# Patient Record
Sex: MALE | Race: OTHER | HISPANIC OR LATINO | Employment: FULL TIME | URBAN - METROPOLITAN AREA
[De-identification: names, ages, dates, MRNs, and addresses within clinical notes are randomized per-mention and may not be internally consistent; named-entity substitution may affect disease eponyms.]

---

## 2017-01-02 ENCOUNTER — APPOINTMENT (OUTPATIENT)
Dept: URGENT CARE | Facility: MEDICAL CENTER | Age: 32
End: 2017-01-02
Payer: OTHER MISCELLANEOUS

## 2017-01-02 PROCEDURE — 99214 OFFICE O/P EST MOD 30 MIN: CPT

## 2017-01-03 ENCOUNTER — APPOINTMENT (OUTPATIENT)
Dept: PHYSICAL THERAPY | Facility: REHABILITATION | Age: 32
End: 2017-01-03
Payer: OTHER MISCELLANEOUS

## 2017-01-03 PROCEDURE — 97110 THERAPEUTIC EXERCISES: CPT

## 2017-01-03 PROCEDURE — 97140 MANUAL THERAPY 1/> REGIONS: CPT

## 2017-01-03 PROCEDURE — 97014 ELECTRIC STIMULATION THERAPY: CPT

## 2017-01-06 ENCOUNTER — APPOINTMENT (OUTPATIENT)
Dept: PHYSICAL THERAPY | Facility: REHABILITATION | Age: 32
End: 2017-01-06
Payer: OTHER MISCELLANEOUS

## 2017-01-06 PROCEDURE — 97110 THERAPEUTIC EXERCISES: CPT

## 2017-01-06 PROCEDURE — 97140 MANUAL THERAPY 1/> REGIONS: CPT

## 2017-01-06 PROCEDURE — 97014 ELECTRIC STIMULATION THERAPY: CPT

## 2017-01-09 ENCOUNTER — APPOINTMENT (OUTPATIENT)
Dept: URGENT CARE | Facility: MEDICAL CENTER | Age: 32
End: 2017-01-09
Payer: OTHER MISCELLANEOUS

## 2017-01-09 ENCOUNTER — APPOINTMENT (OUTPATIENT)
Dept: PHYSICAL THERAPY | Facility: REHABILITATION | Age: 32
End: 2017-01-09
Payer: OTHER MISCELLANEOUS

## 2017-01-09 PROCEDURE — 97110 THERAPEUTIC EXERCISES: CPT

## 2017-01-09 PROCEDURE — 97140 MANUAL THERAPY 1/> REGIONS: CPT

## 2017-01-09 PROCEDURE — 97033 APP MDLTY 1+IONTPHRSIS EA 15: CPT

## 2017-01-09 PROCEDURE — 99214 OFFICE O/P EST MOD 30 MIN: CPT

## 2017-01-11 ENCOUNTER — APPOINTMENT (OUTPATIENT)
Dept: PHYSICAL THERAPY | Facility: REHABILITATION | Age: 32
End: 2017-01-11
Payer: OTHER MISCELLANEOUS

## 2017-01-11 PROCEDURE — 97033 APP MDLTY 1+IONTPHRSIS EA 15: CPT

## 2017-01-11 PROCEDURE — 97110 THERAPEUTIC EXERCISES: CPT

## 2017-01-11 PROCEDURE — 97140 MANUAL THERAPY 1/> REGIONS: CPT

## 2017-01-13 ENCOUNTER — APPOINTMENT (OUTPATIENT)
Dept: PHYSICAL THERAPY | Facility: REHABILITATION | Age: 32
End: 2017-01-13
Payer: OTHER MISCELLANEOUS

## 2017-01-13 PROCEDURE — 97140 MANUAL THERAPY 1/> REGIONS: CPT

## 2017-01-13 PROCEDURE — 97110 THERAPEUTIC EXERCISES: CPT

## 2017-01-13 PROCEDURE — 97035 APP MDLTY 1+ULTRASOUND EA 15: CPT

## 2017-01-16 ENCOUNTER — ALLSCRIPTS OFFICE VISIT (OUTPATIENT)
Dept: OTHER | Facility: OTHER | Age: 32
End: 2017-01-16

## 2017-01-16 DIAGNOSIS — M25.521 PAIN IN RIGHT ELBOW: ICD-10-CM

## 2017-01-16 DIAGNOSIS — M77.10 LATERAL EPICONDYLITIS OF ELBOW: ICD-10-CM

## 2017-01-27 ENCOUNTER — APPOINTMENT (OUTPATIENT)
Dept: OCCUPATIONAL THERAPY | Facility: MEDICAL CENTER | Age: 32
End: 2017-01-27
Payer: OTHER MISCELLANEOUS

## 2017-01-27 DIAGNOSIS — M25.521 PAIN IN RIGHT ELBOW: ICD-10-CM

## 2017-01-27 DIAGNOSIS — M77.10 LATERAL EPICONDYLITIS OF ELBOW: ICD-10-CM

## 2017-01-27 PROCEDURE — 97166 OT EVAL MOD COMPLEX 45 MIN: CPT

## 2017-02-01 ENCOUNTER — APPOINTMENT (OUTPATIENT)
Dept: OCCUPATIONAL THERAPY | Facility: MEDICAL CENTER | Age: 32
End: 2017-02-01
Payer: OTHER MISCELLANEOUS

## 2017-02-01 PROCEDURE — 97010 HOT OR COLD PACKS THERAPY: CPT

## 2017-02-01 PROCEDURE — 97033 APP MDLTY 1+IONTPHRSIS EA 15: CPT

## 2017-02-01 PROCEDURE — 97110 THERAPEUTIC EXERCISES: CPT

## 2017-02-03 ENCOUNTER — APPOINTMENT (OUTPATIENT)
Dept: OCCUPATIONAL THERAPY | Facility: MEDICAL CENTER | Age: 32
End: 2017-02-03
Payer: OTHER MISCELLANEOUS

## 2017-02-06 ENCOUNTER — APPOINTMENT (OUTPATIENT)
Dept: OCCUPATIONAL THERAPY | Facility: MEDICAL CENTER | Age: 32
End: 2017-02-06
Payer: OTHER MISCELLANEOUS

## 2017-02-06 PROCEDURE — 97110 THERAPEUTIC EXERCISES: CPT

## 2017-02-06 PROCEDURE — 97033 APP MDLTY 1+IONTPHRSIS EA 15: CPT

## 2017-02-06 PROCEDURE — 97010 HOT OR COLD PACKS THERAPY: CPT

## 2017-02-08 ENCOUNTER — APPOINTMENT (OUTPATIENT)
Dept: OCCUPATIONAL THERAPY | Facility: MEDICAL CENTER | Age: 32
End: 2017-02-08
Payer: OTHER MISCELLANEOUS

## 2017-02-08 PROCEDURE — 97110 THERAPEUTIC EXERCISES: CPT

## 2017-02-08 PROCEDURE — 97140 MANUAL THERAPY 1/> REGIONS: CPT

## 2017-02-08 PROCEDURE — 97010 HOT OR COLD PACKS THERAPY: CPT

## 2017-02-13 ENCOUNTER — APPOINTMENT (OUTPATIENT)
Dept: OCCUPATIONAL THERAPY | Facility: MEDICAL CENTER | Age: 32
End: 2017-02-13
Payer: OTHER MISCELLANEOUS

## 2017-02-13 PROCEDURE — 97110 THERAPEUTIC EXERCISES: CPT

## 2017-02-13 PROCEDURE — 97033 APP MDLTY 1+IONTPHRSIS EA 15: CPT

## 2017-02-13 PROCEDURE — 97010 HOT OR COLD PACKS THERAPY: CPT

## 2017-02-15 ENCOUNTER — APPOINTMENT (OUTPATIENT)
Dept: OCCUPATIONAL THERAPY | Facility: MEDICAL CENTER | Age: 32
End: 2017-02-15
Payer: OTHER MISCELLANEOUS

## 2017-02-15 PROCEDURE — 97010 HOT OR COLD PACKS THERAPY: CPT

## 2017-02-15 PROCEDURE — 97033 APP MDLTY 1+IONTPHRSIS EA 15: CPT

## 2017-02-15 PROCEDURE — 97110 THERAPEUTIC EXERCISES: CPT

## 2017-02-20 ENCOUNTER — ALLSCRIPTS OFFICE VISIT (OUTPATIENT)
Dept: OTHER | Facility: OTHER | Age: 32
End: 2017-02-20

## 2017-02-22 ENCOUNTER — APPOINTMENT (OUTPATIENT)
Dept: OCCUPATIONAL THERAPY | Facility: MEDICAL CENTER | Age: 32
End: 2017-02-22
Payer: OTHER MISCELLANEOUS

## 2017-02-22 PROCEDURE — 97033 APP MDLTY 1+IONTPHRSIS EA 15: CPT

## 2017-02-22 PROCEDURE — 97010 HOT OR COLD PACKS THERAPY: CPT

## 2017-02-22 PROCEDURE — 97110 THERAPEUTIC EXERCISES: CPT

## 2017-02-24 ENCOUNTER — APPOINTMENT (OUTPATIENT)
Dept: OCCUPATIONAL THERAPY | Facility: MEDICAL CENTER | Age: 32
End: 2017-02-24
Payer: OTHER MISCELLANEOUS

## 2017-02-24 PROCEDURE — 97033 APP MDLTY 1+IONTPHRSIS EA 15: CPT

## 2017-02-24 PROCEDURE — 97010 HOT OR COLD PACKS THERAPY: CPT

## 2017-02-24 PROCEDURE — 97110 THERAPEUTIC EXERCISES: CPT

## 2017-02-24 PROCEDURE — 97140 MANUAL THERAPY 1/> REGIONS: CPT

## 2017-02-27 ENCOUNTER — APPOINTMENT (OUTPATIENT)
Dept: OCCUPATIONAL THERAPY | Facility: MEDICAL CENTER | Age: 32
End: 2017-02-27
Payer: OTHER MISCELLANEOUS

## 2017-02-27 PROCEDURE — 97033 APP MDLTY 1+IONTPHRSIS EA 15: CPT

## 2017-02-27 PROCEDURE — 97110 THERAPEUTIC EXERCISES: CPT

## 2017-02-27 PROCEDURE — 97010 HOT OR COLD PACKS THERAPY: CPT

## 2017-03-01 ENCOUNTER — APPOINTMENT (OUTPATIENT)
Dept: OCCUPATIONAL THERAPY | Facility: MEDICAL CENTER | Age: 32
End: 2017-03-01
Payer: OTHER MISCELLANEOUS

## 2017-03-01 PROCEDURE — 97010 HOT OR COLD PACKS THERAPY: CPT

## 2017-03-01 PROCEDURE — 97110 THERAPEUTIC EXERCISES: CPT

## 2017-03-01 PROCEDURE — 97033 APP MDLTY 1+IONTPHRSIS EA 15: CPT

## 2017-03-27 ENCOUNTER — ALLSCRIPTS OFFICE VISIT (OUTPATIENT)
Dept: OTHER | Facility: OTHER | Age: 32
End: 2017-03-27

## 2017-03-27 ENCOUNTER — TRANSCRIBE ORDERS (OUTPATIENT)
Dept: ADMINISTRATIVE | Facility: HOSPITAL | Age: 32
End: 2017-03-27

## 2017-03-27 DIAGNOSIS — M25.529 PAIN IN JOINT, UPPER ARM, UNSPECIFIED LATERALITY: Primary | ICD-10-CM

## 2017-03-31 ENCOUNTER — HOSPITAL ENCOUNTER (OUTPATIENT)
Dept: RADIOLOGY | Age: 32
Discharge: HOME | End: 2017-03-31
Payer: OTHER MISCELLANEOUS

## 2017-03-31 DIAGNOSIS — M25.529 PAIN IN JOINT, UPPER ARM, UNSPECIFIED LATERALITY: ICD-10-CM

## 2017-03-31 PROCEDURE — 73221 MRI JOINT UPR EXTREM W/O DYE: CPT

## 2017-04-11 ENCOUNTER — ALLSCRIPTS OFFICE VISIT (OUTPATIENT)
Dept: OTHER | Facility: OTHER | Age: 32
End: 2017-04-11

## 2017-05-17 ENCOUNTER — LAB (OUTPATIENT)
Dept: LAB | Facility: HOSPITAL | Age: 32
End: 2017-05-17
Attending: ORTHOPAEDIC SURGERY
Payer: COMMERCIAL

## 2017-05-17 ENCOUNTER — TRANSCRIBE ORDERS (OUTPATIENT)
Dept: RADIOLOGY | Facility: HOSPITAL | Age: 32
End: 2017-05-17

## 2017-05-17 ENCOUNTER — TRANSCRIBE ORDERS (OUTPATIENT)
Dept: LAB | Facility: HOSPITAL | Age: 32
End: 2017-05-17

## 2017-05-17 ENCOUNTER — HOSPITAL ENCOUNTER (OUTPATIENT)
Dept: RADIOLOGY | Facility: HOSPITAL | Age: 32
Discharge: HOME | End: 2017-05-17
Attending: ORTHOPAEDIC SURGERY
Payer: OTHER MISCELLANEOUS

## 2017-05-17 ENCOUNTER — ALLSCRIPTS OFFICE VISIT (OUTPATIENT)
Dept: OTHER | Facility: OTHER | Age: 32
End: 2017-05-17

## 2017-05-17 DIAGNOSIS — M77.11 LATERAL EPICONDYLITIS OF RIGHT ELBOW: ICD-10-CM

## 2017-05-17 DIAGNOSIS — M77.11 RIGHT LATERAL EPICONDYLITIS: ICD-10-CM

## 2017-05-17 DIAGNOSIS — M77.11 RIGHT LATERAL EPICONDYLITIS: Primary | ICD-10-CM

## 2017-05-17 LAB
ALBUMIN SERPL BCP-MCNC: 4 G/DL (ref 3.5–5)
ALP SERPL-CCNC: 69 U/L (ref 46–116)
ALT SERPL W P-5'-P-CCNC: 29 U/L (ref 12–78)
ANION GAP SERPL CALCULATED.3IONS-SCNC: 5 MMOL/L (ref 4–13)
AST SERPL W P-5'-P-CCNC: 23 U/L (ref 5–45)
BASOPHILS # BLD AUTO: 0.01 THOUSANDS/ΜL (ref 0–0.1)
BASOPHILS NFR BLD AUTO: 0 % (ref 0–1)
BILIRUB SERPL-MCNC: 1.11 MG/DL (ref 0.2–1)
BUN SERPL-MCNC: 12 MG/DL (ref 5–25)
CALCIUM SERPL-MCNC: 8.9 MG/DL (ref 8.3–10.1)
CHLORIDE SERPL-SCNC: 103 MMOL/L (ref 100–108)
CO2 SERPL-SCNC: 29 MMOL/L (ref 21–32)
CREAT SERPL-MCNC: 0.89 MG/DL (ref 0.6–1.3)
EOSINOPHIL # BLD AUTO: 0.14 THOUSAND/ΜL (ref 0–0.61)
EOSINOPHIL NFR BLD AUTO: 2 % (ref 0–6)
ERYTHROCYTE [DISTWIDTH] IN BLOOD BY AUTOMATED COUNT: 12.2 % (ref 11.6–15.1)
GFR SERPL CREATININE-BSD FRML MDRD: >60 ML/MIN/1.73SQ M
GLUCOSE P FAST SERPL-MCNC: 80 MG/DL (ref 65–99)
HCT VFR BLD AUTO: 45.8 % (ref 36.5–49.3)
HGB BLD-MCNC: 16.2 G/DL (ref 12–17)
LYMPHOCYTES # BLD AUTO: 1.88 THOUSANDS/ΜL (ref 0.6–4.47)
LYMPHOCYTES NFR BLD AUTO: 29 % (ref 14–44)
MCH RBC QN AUTO: 32.3 PG (ref 26.8–34.3)
MCHC RBC AUTO-ENTMCNC: 35.4 G/DL (ref 31.4–37.4)
MCV RBC AUTO: 91 FL (ref 82–98)
MONOCYTES # BLD AUTO: 0.56 THOUSAND/ΜL (ref 0.17–1.22)
MONOCYTES NFR BLD AUTO: 9 % (ref 4–12)
NEUTROPHILS # BLD AUTO: 3.91 THOUSANDS/ΜL (ref 1.85–7.62)
NEUTS SEG NFR BLD AUTO: 60 % (ref 43–75)
NRBC BLD AUTO-RTO: 0 /100 WBCS
PLATELET # BLD AUTO: 228 THOUSANDS/UL (ref 149–390)
PMV BLD AUTO: 10.2 FL (ref 8.9–12.7)
POTASSIUM SERPL-SCNC: 4.1 MMOL/L (ref 3.5–5.3)
PROT SERPL-MCNC: 8 G/DL (ref 6.4–8.2)
RBC # BLD AUTO: 5.01 MILLION/UL (ref 3.88–5.62)
SODIUM SERPL-SCNC: 137 MMOL/L (ref 136–145)
WBC # BLD AUTO: 6.52 THOUSAND/UL (ref 4.31–10.16)

## 2017-05-17 PROCEDURE — 80053 COMPREHEN METABOLIC PANEL: CPT

## 2017-05-17 PROCEDURE — 36415 COLL VENOUS BLD VENIPUNCTURE: CPT

## 2017-05-17 PROCEDURE — 85025 COMPLETE CBC W/AUTO DIFF WBC: CPT

## 2017-05-17 PROCEDURE — 71020 HB CHEST X-RAY 2VW FRONTAL&LATL: CPT

## 2017-05-17 PROCEDURE — 93005 ELECTROCARDIOGRAM TRACING: CPT

## 2017-05-22 LAB
ATRIAL RATE: 66 BPM
P AXIS: 18 DEGREES
PR INTERVAL: 146 MS
QRS AXIS: 54 DEGREES
QRSD INTERVAL: 90 MS
QT INTERVAL: 372 MS
QTC INTERVAL: 389 MS
T WAVE AXIS: 19 DEGREES
VENTRICULAR RATE: 66 BPM

## 2017-06-22 ENCOUNTER — ANESTHESIA EVENT (OUTPATIENT)
Dept: PERIOP | Facility: HOSPITAL | Age: 32
End: 2017-06-22
Payer: OTHER MISCELLANEOUS

## 2017-06-22 RX ORDER — NICOTINE POLACRILEX 4 MG/1
20 GUM, CHEWING ORAL DAILY PRN
COMMUNITY
Start: 2014-05-09 | End: 2018-08-13 | Stop reason: ALTCHOICE

## 2017-06-27 ENCOUNTER — ANESTHESIA (OUTPATIENT)
Dept: PERIOP | Facility: HOSPITAL | Age: 32
End: 2017-06-27
Payer: OTHER MISCELLANEOUS

## 2017-06-27 ENCOUNTER — HOSPITAL ENCOUNTER (OUTPATIENT)
Facility: HOSPITAL | Age: 32
Setting detail: OUTPATIENT SURGERY
Discharge: HOME | End: 2017-06-27
Attending: ORTHOPAEDIC SURGERY | Admitting: ORTHOPAEDIC SURGERY
Payer: OTHER MISCELLANEOUS

## 2017-06-27 VITALS
SYSTOLIC BLOOD PRESSURE: 129 MMHG | HEIGHT: 70 IN | BODY MASS INDEX: 32.21 KG/M2 | OXYGEN SATURATION: 100 % | DIASTOLIC BLOOD PRESSURE: 76 MMHG | HEART RATE: 85 BPM | TEMPERATURE: 100.2 F | WEIGHT: 225 LBS | RESPIRATION RATE: 18 BRPM

## 2017-06-27 DIAGNOSIS — M77.11 LATERAL EPICONDYLITIS OF RIGHT ELBOW: ICD-10-CM

## 2017-06-27 PROBLEM — M77.10 LATERAL EPICONDYLITIS: Status: ACTIVE | Noted: 2017-06-27

## 2017-06-27 PROCEDURE — 88305 TISSUE EXAM BY PATHOLOGIST: CPT | Performed by: ORTHOPAEDIC SURGERY

## 2017-06-27 RX ORDER — HYDROCODONE BITARTRATE AND ACETAMINOPHEN 5; 325 MG/1; MG/1
1 TABLET ORAL EVERY 6 HOURS PRN
Qty: 10 TABLET | Refills: 0 | Status: SHIPPED | OUTPATIENT
Start: 2017-06-27 | End: 2017-07-07

## 2017-06-27 RX ORDER — SODIUM CHLORIDE, SODIUM LACTATE, POTASSIUM CHLORIDE, CALCIUM CHLORIDE 600; 310; 30; 20 MG/100ML; MG/100ML; MG/100ML; MG/100ML
20 INJECTION, SOLUTION INTRAVENOUS CONTINUOUS
Status: DISCONTINUED | OUTPATIENT
Start: 2017-06-27 | End: 2017-06-27 | Stop reason: HOSPADM

## 2017-06-27 RX ORDER — FENTANYL CITRATE 50 UG/ML
INJECTION, SOLUTION INTRAMUSCULAR; INTRAVENOUS AS NEEDED
Status: DISCONTINUED | OUTPATIENT
Start: 2017-06-27 | End: 2017-06-27 | Stop reason: SURG

## 2017-06-27 RX ORDER — ONDANSETRON 2 MG/ML
INJECTION INTRAMUSCULAR; INTRAVENOUS AS NEEDED
Status: DISCONTINUED | OUTPATIENT
Start: 2017-06-27 | End: 2017-06-27 | Stop reason: SURG

## 2017-06-27 RX ORDER — MAGNESIUM HYDROXIDE 1200 MG/15ML
LIQUID ORAL AS NEEDED
Status: DISCONTINUED | OUTPATIENT
Start: 2017-06-27 | End: 2017-06-27 | Stop reason: HOSPADM

## 2017-06-27 RX ORDER — HYDROCODONE BITARTRATE AND ACETAMINOPHEN 5; 325 MG/1; MG/1
2 TABLET ORAL EVERY 6 HOURS PRN
Status: DISCONTINUED | OUTPATIENT
Start: 2017-06-27 | End: 2017-06-27 | Stop reason: HOSPADM

## 2017-06-27 RX ORDER — FENTANYL CITRATE/PF 50 MCG/ML
50 SYRINGE (ML) INJECTION AS NEEDED
Status: COMPLETED | OUTPATIENT
Start: 2017-06-27 | End: 2017-06-27

## 2017-06-27 RX ORDER — BUPIVACAINE HYDROCHLORIDE AND EPINEPHRINE 2.5; 5 MG/ML; UG/ML
INJECTION, SOLUTION EPIDURAL; INFILTRATION; INTRACAUDAL; PERINEURAL AS NEEDED
Status: DISCONTINUED | OUTPATIENT
Start: 2017-06-27 | End: 2017-06-27 | Stop reason: HOSPADM

## 2017-06-27 RX ORDER — PROPOFOL 10 MG/ML
INJECTION, EMULSION INTRAVENOUS AS NEEDED
Status: DISCONTINUED | OUTPATIENT
Start: 2017-06-27 | End: 2017-06-27 | Stop reason: SURG

## 2017-06-27 RX ORDER — MIDAZOLAM HYDROCHLORIDE 1 MG/ML
INJECTION INTRAMUSCULAR; INTRAVENOUS AS NEEDED
Status: DISCONTINUED | OUTPATIENT
Start: 2017-06-27 | End: 2017-06-27 | Stop reason: SURG

## 2017-06-27 RX ORDER — SODIUM CHLORIDE, SODIUM LACTATE, POTASSIUM CHLORIDE, CALCIUM CHLORIDE 600; 310; 30; 20 MG/100ML; MG/100ML; MG/100ML; MG/100ML
75 INJECTION, SOLUTION INTRAVENOUS CONTINUOUS
Status: DISCONTINUED | OUTPATIENT
Start: 2017-06-27 | End: 2017-06-27 | Stop reason: HOSPADM

## 2017-06-27 RX ORDER — PROMETHAZINE HYDROCHLORIDE 25 MG/ML
12.5 INJECTION, SOLUTION INTRAMUSCULAR; INTRAVENOUS ONCE AS NEEDED
Status: DISCONTINUED | OUTPATIENT
Start: 2017-06-27 | End: 2017-06-27 | Stop reason: HOSPADM

## 2017-06-27 RX ORDER — LIDOCAINE HYDROCHLORIDE 10 MG/ML
INJECTION, SOLUTION INFILTRATION; PERINEURAL AS NEEDED
Status: DISCONTINUED | OUTPATIENT
Start: 2017-06-27 | End: 2017-06-27 | Stop reason: SURG

## 2017-06-27 RX ADMIN — LIDOCAINE HYDROCHLORIDE 50 MG: 10 INJECTION, SOLUTION INFILTRATION; PERINEURAL at 08:38

## 2017-06-27 RX ADMIN — CEFAZOLIN SODIUM 2000 MG: 2 SOLUTION INTRAVENOUS at 08:43

## 2017-06-27 RX ADMIN — PROPOFOL 250 MG: 10 INJECTION, EMULSION INTRAVENOUS at 08:38

## 2017-06-27 RX ADMIN — MIDAZOLAM HYDROCHLORIDE 2 MG: 1 INJECTION, SOLUTION INTRAMUSCULAR; INTRAVENOUS at 08:34

## 2017-06-27 RX ADMIN — FENTANYL CITRATE 50 MCG: 50 INJECTION, SOLUTION INTRAMUSCULAR; INTRAVENOUS at 08:47

## 2017-06-27 RX ADMIN — HYDROCODONE BITARTATE AND ACETAMINOPHEN 2 TABLET: 5; 325 TABLET ORAL at 10:05

## 2017-06-27 RX ADMIN — HYDROMORPHONE HYDROCHLORIDE 0.2 MG: 1 INJECTION, SOLUTION INTRAMUSCULAR; INTRAVENOUS; SUBCUTANEOUS at 09:55

## 2017-06-27 RX ADMIN — HYDROMORPHONE HYDROCHLORIDE 0.2 MG: 1 INJECTION, SOLUTION INTRAMUSCULAR; INTRAVENOUS; SUBCUTANEOUS at 09:46

## 2017-06-27 RX ADMIN — FENTANYL CITRATE 50 MCG: 50 INJECTION INTRAMUSCULAR; INTRAVENOUS at 09:30

## 2017-06-27 RX ADMIN — SODIUM CHLORIDE, SODIUM LACTATE, POTASSIUM CHLORIDE, AND CALCIUM CHLORIDE 20 ML/HR: .6; .31; .03; .02 INJECTION, SOLUTION INTRAVENOUS at 08:00

## 2017-06-27 RX ADMIN — DEXAMETHASONE SODIUM PHOSPHATE 10 MG: 10 INJECTION INTRAMUSCULAR; INTRAVENOUS at 08:50

## 2017-06-27 RX ADMIN — ONDANSETRON 4 MG: 2 INJECTION INTRAMUSCULAR; INTRAVENOUS at 09:05

## 2017-06-27 RX ADMIN — FENTANYL CITRATE 50 MCG: 50 INJECTION INTRAMUSCULAR; INTRAVENOUS at 09:40

## 2017-07-05 ENCOUNTER — APPOINTMENT (OUTPATIENT)
Dept: OCCUPATIONAL THERAPY | Facility: MEDICAL CENTER | Age: 32
End: 2017-07-05
Payer: OTHER MISCELLANEOUS

## 2017-07-05 ENCOUNTER — ALLSCRIPTS OFFICE VISIT (OUTPATIENT)
Dept: OTHER | Facility: OTHER | Age: 32
End: 2017-07-05

## 2017-07-05 DIAGNOSIS — M77.11 LATERAL EPICONDYLITIS OF RIGHT ELBOW: ICD-10-CM

## 2017-07-05 PROCEDURE — 97165 OT EVAL LOW COMPLEX 30 MIN: CPT

## 2017-07-07 ENCOUNTER — APPOINTMENT (OUTPATIENT)
Dept: OCCUPATIONAL THERAPY | Facility: MEDICAL CENTER | Age: 32
End: 2017-07-07
Payer: OTHER MISCELLANEOUS

## 2017-07-07 PROCEDURE — 97140 MANUAL THERAPY 1/> REGIONS: CPT

## 2017-07-07 PROCEDURE — 97010 HOT OR COLD PACKS THERAPY: CPT

## 2017-07-07 PROCEDURE — 97110 THERAPEUTIC EXERCISES: CPT

## 2017-07-10 ENCOUNTER — APPOINTMENT (OUTPATIENT)
Dept: OCCUPATIONAL THERAPY | Facility: MEDICAL CENTER | Age: 32
End: 2017-07-10
Payer: OTHER MISCELLANEOUS

## 2017-07-10 PROCEDURE — 97140 MANUAL THERAPY 1/> REGIONS: CPT

## 2017-07-10 PROCEDURE — 97110 THERAPEUTIC EXERCISES: CPT

## 2017-07-10 PROCEDURE — 97010 HOT OR COLD PACKS THERAPY: CPT

## 2017-07-12 ENCOUNTER — APPOINTMENT (OUTPATIENT)
Dept: OCCUPATIONAL THERAPY | Facility: MEDICAL CENTER | Age: 32
End: 2017-07-12
Payer: OTHER MISCELLANEOUS

## 2017-07-12 PROCEDURE — 97110 THERAPEUTIC EXERCISES: CPT

## 2017-07-12 PROCEDURE — 97010 HOT OR COLD PACKS THERAPY: CPT

## 2017-07-12 PROCEDURE — 97140 MANUAL THERAPY 1/> REGIONS: CPT

## 2017-07-17 ENCOUNTER — APPOINTMENT (OUTPATIENT)
Dept: OCCUPATIONAL THERAPY | Facility: MEDICAL CENTER | Age: 32
End: 2017-07-17
Payer: OTHER MISCELLANEOUS

## 2017-07-17 PROCEDURE — 97110 THERAPEUTIC EXERCISES: CPT

## 2017-07-17 PROCEDURE — 97140 MANUAL THERAPY 1/> REGIONS: CPT

## 2017-07-17 PROCEDURE — 97010 HOT OR COLD PACKS THERAPY: CPT

## 2017-07-19 ENCOUNTER — APPOINTMENT (OUTPATIENT)
Dept: OCCUPATIONAL THERAPY | Facility: MEDICAL CENTER | Age: 32
End: 2017-07-19
Payer: OTHER MISCELLANEOUS

## 2017-07-19 PROCEDURE — 97010 HOT OR COLD PACKS THERAPY: CPT

## 2017-07-19 PROCEDURE — 97035 APP MDLTY 1+ULTRASOUND EA 15: CPT

## 2017-07-19 PROCEDURE — 97140 MANUAL THERAPY 1/> REGIONS: CPT

## 2017-07-19 PROCEDURE — 97110 THERAPEUTIC EXERCISES: CPT

## 2017-07-24 ENCOUNTER — APPOINTMENT (OUTPATIENT)
Dept: OCCUPATIONAL THERAPY | Facility: MEDICAL CENTER | Age: 32
End: 2017-07-24
Payer: OTHER MISCELLANEOUS

## 2017-07-26 ENCOUNTER — APPOINTMENT (OUTPATIENT)
Dept: OCCUPATIONAL THERAPY | Facility: MEDICAL CENTER | Age: 32
End: 2017-07-26
Payer: OTHER MISCELLANEOUS

## 2017-07-26 PROCEDURE — 97140 MANUAL THERAPY 1/> REGIONS: CPT

## 2017-07-26 PROCEDURE — 97010 HOT OR COLD PACKS THERAPY: CPT

## 2017-07-26 PROCEDURE — 97110 THERAPEUTIC EXERCISES: CPT

## 2017-07-28 ENCOUNTER — APPOINTMENT (OUTPATIENT)
Dept: OCCUPATIONAL THERAPY | Facility: MEDICAL CENTER | Age: 32
End: 2017-07-28
Payer: OTHER MISCELLANEOUS

## 2017-07-28 PROCEDURE — 97010 HOT OR COLD PACKS THERAPY: CPT

## 2017-07-28 PROCEDURE — 97110 THERAPEUTIC EXERCISES: CPT

## 2017-07-28 PROCEDURE — 97140 MANUAL THERAPY 1/> REGIONS: CPT

## 2017-07-31 ENCOUNTER — APPOINTMENT (OUTPATIENT)
Dept: OCCUPATIONAL THERAPY | Facility: MEDICAL CENTER | Age: 32
End: 2017-07-31
Payer: OTHER MISCELLANEOUS

## 2017-07-31 PROCEDURE — 97110 THERAPEUTIC EXERCISES: CPT

## 2017-07-31 PROCEDURE — 97010 HOT OR COLD PACKS THERAPY: CPT

## 2017-07-31 PROCEDURE — 97140 MANUAL THERAPY 1/> REGIONS: CPT

## 2017-08-02 ENCOUNTER — APPOINTMENT (OUTPATIENT)
Dept: OCCUPATIONAL THERAPY | Facility: MEDICAL CENTER | Age: 32
End: 2017-08-02
Payer: OTHER MISCELLANEOUS

## 2017-08-02 PROCEDURE — 97010 HOT OR COLD PACKS THERAPY: CPT

## 2017-08-02 PROCEDURE — 97140 MANUAL THERAPY 1/> REGIONS: CPT

## 2017-08-02 PROCEDURE — 97110 THERAPEUTIC EXERCISES: CPT

## 2017-08-07 ENCOUNTER — APPOINTMENT (OUTPATIENT)
Dept: OCCUPATIONAL THERAPY | Facility: MEDICAL CENTER | Age: 32
End: 2017-08-07
Payer: OTHER MISCELLANEOUS

## 2017-08-07 PROCEDURE — 97110 THERAPEUTIC EXERCISES: CPT

## 2017-08-07 PROCEDURE — 97010 HOT OR COLD PACKS THERAPY: CPT

## 2017-08-07 PROCEDURE — 97140 MANUAL THERAPY 1/> REGIONS: CPT

## 2017-08-09 ENCOUNTER — APPOINTMENT (OUTPATIENT)
Dept: OCCUPATIONAL THERAPY | Facility: MEDICAL CENTER | Age: 32
End: 2017-08-09
Payer: OTHER MISCELLANEOUS

## 2017-08-09 PROCEDURE — 97110 THERAPEUTIC EXERCISES: CPT

## 2017-08-09 PROCEDURE — 97010 HOT OR COLD PACKS THERAPY: CPT

## 2017-08-14 ENCOUNTER — ALLSCRIPTS OFFICE VISIT (OUTPATIENT)
Dept: OTHER | Facility: OTHER | Age: 32
End: 2017-08-14

## 2017-08-16 ENCOUNTER — ALLSCRIPTS OFFICE VISIT (OUTPATIENT)
Dept: OTHER | Facility: OTHER | Age: 32
End: 2017-08-16

## 2017-08-16 DIAGNOSIS — Z47.89 ENCOUNTER FOR OTHER ORTHOPEDIC AFTERCARE: ICD-10-CM

## 2017-08-23 ENCOUNTER — APPOINTMENT (OUTPATIENT)
Dept: OCCUPATIONAL THERAPY | Facility: MEDICAL CENTER | Age: 32
End: 2017-08-23
Payer: OTHER MISCELLANEOUS

## 2017-08-23 PROCEDURE — 97110 THERAPEUTIC EXERCISES: CPT

## 2017-08-25 ENCOUNTER — APPOINTMENT (OUTPATIENT)
Dept: OCCUPATIONAL THERAPY | Facility: MEDICAL CENTER | Age: 32
End: 2017-08-25
Payer: OTHER MISCELLANEOUS

## 2017-08-25 PROCEDURE — 97110 THERAPEUTIC EXERCISES: CPT

## 2017-08-25 PROCEDURE — 97010 HOT OR COLD PACKS THERAPY: CPT

## 2017-08-28 ENCOUNTER — APPOINTMENT (OUTPATIENT)
Dept: OCCUPATIONAL THERAPY | Facility: MEDICAL CENTER | Age: 32
End: 2017-08-28
Payer: OTHER MISCELLANEOUS

## 2017-08-28 PROCEDURE — 97110 THERAPEUTIC EXERCISES: CPT

## 2017-08-30 ENCOUNTER — APPOINTMENT (OUTPATIENT)
Dept: OCCUPATIONAL THERAPY | Facility: MEDICAL CENTER | Age: 32
End: 2017-08-30
Payer: OTHER MISCELLANEOUS

## 2017-08-30 PROCEDURE — 97110 THERAPEUTIC EXERCISES: CPT

## 2017-09-06 ENCOUNTER — APPOINTMENT (OUTPATIENT)
Dept: OCCUPATIONAL THERAPY | Facility: MEDICAL CENTER | Age: 32
End: 2017-09-06
Payer: OTHER MISCELLANEOUS

## 2017-09-06 PROCEDURE — 97110 THERAPEUTIC EXERCISES: CPT

## 2017-09-08 ENCOUNTER — APPOINTMENT (OUTPATIENT)
Dept: OCCUPATIONAL THERAPY | Facility: MEDICAL CENTER | Age: 32
End: 2017-09-08
Payer: OTHER MISCELLANEOUS

## 2017-09-08 PROCEDURE — 97010 HOT OR COLD PACKS THERAPY: CPT

## 2017-09-08 PROCEDURE — 97110 THERAPEUTIC EXERCISES: CPT

## 2017-09-11 ENCOUNTER — APPOINTMENT (OUTPATIENT)
Dept: OCCUPATIONAL THERAPY | Facility: MEDICAL CENTER | Age: 32
End: 2017-09-11
Payer: OTHER MISCELLANEOUS

## 2017-09-13 ENCOUNTER — APPOINTMENT (OUTPATIENT)
Dept: OCCUPATIONAL THERAPY | Facility: MEDICAL CENTER | Age: 32
End: 2017-09-13
Payer: OTHER MISCELLANEOUS

## 2017-09-18 ENCOUNTER — APPOINTMENT (OUTPATIENT)
Dept: OCCUPATIONAL THERAPY | Facility: MEDICAL CENTER | Age: 32
End: 2017-09-18
Payer: OTHER MISCELLANEOUS

## 2017-09-20 ENCOUNTER — APPOINTMENT (OUTPATIENT)
Dept: OCCUPATIONAL THERAPY | Facility: MEDICAL CENTER | Age: 32
End: 2017-09-20
Payer: OTHER MISCELLANEOUS

## 2017-09-21 ENCOUNTER — APPOINTMENT (OUTPATIENT)
Dept: OCCUPATIONAL THERAPY | Facility: MEDICAL CENTER | Age: 32
End: 2017-09-21
Payer: OTHER MISCELLANEOUS

## 2017-10-18 ENCOUNTER — ALLSCRIPTS OFFICE VISIT (OUTPATIENT)
Dept: OTHER | Facility: OTHER | Age: 32
End: 2017-10-18
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
## 2018-01-10 NOTE — RESULT NOTES
Message   Tiny osteochondroma of the distal femur  [facility] consult for this and left knee pain  Verified Results  XR KNEE 1 OR 2 VIEW LEFT 26Apr2016 12:11PM Chelsea Licea   TW Order Number: [id number]     Test Name Result Flag Reference   XR KNEE 1 OR 2 VW LEFT (Report)     LEFT KNEE     INDICATION: Left knee pain  COMPARISON: None     VIEWS: AP and lateral; 3 images     FINDINGS:     There is no acute fracture or dislocation  There is a small joint effusion  A tiny osteochondroma arises from the medial femoral metaphysis  No lytic or blastic lesions are seen  Soft tissues are unremarkable  IMPRESSION:       1  No acute osseous abnormality  2  Tiny osteochondroma of the distal femur         Workstation performed: [id number]     Signed by:   Chaka Watts MD   4/26/16

## 2018-01-12 VITALS
WEIGHT: 223.13 LBS | HEIGHT: 70 IN | HEART RATE: 92 BPM | DIASTOLIC BLOOD PRESSURE: 84 MMHG | SYSTOLIC BLOOD PRESSURE: 146 MMHG | BODY MASS INDEX: 31.94 KG/M2

## 2018-01-12 VITALS
BODY MASS INDEX: 32.21 KG/M2 | HEART RATE: 73 BPM | SYSTOLIC BLOOD PRESSURE: 132 MMHG | WEIGHT: 225 LBS | HEIGHT: 70 IN | DIASTOLIC BLOOD PRESSURE: 89 MMHG

## 2018-01-13 VITALS
BODY MASS INDEX: 32.23 KG/M2 | SYSTOLIC BLOOD PRESSURE: 142 MMHG | HEIGHT: 70 IN | WEIGHT: 225.13 LBS | DIASTOLIC BLOOD PRESSURE: 88 MMHG

## 2018-01-13 VITALS
WEIGHT: 225 LBS | HEIGHT: 70 IN | DIASTOLIC BLOOD PRESSURE: 77 MMHG | HEART RATE: 78 BPM | SYSTOLIC BLOOD PRESSURE: 134 MMHG | BODY MASS INDEX: 32.21 KG/M2

## 2018-01-13 VITALS
BODY MASS INDEX: 32.12 KG/M2 | SYSTOLIC BLOOD PRESSURE: 137 MMHG | HEIGHT: 70 IN | WEIGHT: 224.38 LBS | DIASTOLIC BLOOD PRESSURE: 81 MMHG | HEART RATE: 76 BPM

## 2018-01-13 VITALS
WEIGHT: 26 LBS | BODY MASS INDEX: 3.72 KG/M2 | DIASTOLIC BLOOD PRESSURE: 79 MMHG | HEART RATE: 98 BPM | HEIGHT: 70 IN | SYSTOLIC BLOOD PRESSURE: 122 MMHG

## 2018-01-13 VITALS
HEIGHT: 70 IN | SYSTOLIC BLOOD PRESSURE: 142 MMHG | BODY MASS INDEX: 31.78 KG/M2 | WEIGHT: 222 LBS | HEART RATE: 87 BPM | DIASTOLIC BLOOD PRESSURE: 88 MMHG

## 2018-01-14 VITALS
BODY MASS INDEX: 32.35 KG/M2 | WEIGHT: 226 LBS | SYSTOLIC BLOOD PRESSURE: 132 MMHG | HEIGHT: 70 IN | DIASTOLIC BLOOD PRESSURE: 85 MMHG | HEART RATE: 80 BPM

## 2018-01-14 VITALS
SYSTOLIC BLOOD PRESSURE: 144 MMHG | HEART RATE: 88 BPM | WEIGHT: 222 LBS | BODY MASS INDEX: 31.85 KG/M2 | DIASTOLIC BLOOD PRESSURE: 78 MMHG
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
## 2018-01-16 NOTE — MISCELLANEOUS
Message  Return to work or school:   Carol Peralta is under my professional care  He was seen in my office on 10/18/2017   He is able to return to work on  10/18/2017      Weight Bearing Status: Weight-Bearing As Tolerated  Patient may return to work without restrictions to the right upper extremity weight-bearing as tolerated          Signatures   Electronically signed by : LEANNE Mcgraw ; Oct 20 2017  2:44PM EST                       (Author)

## 2018-08-13 ENCOUNTER — OFFICE VISIT (OUTPATIENT)
Dept: FAMILY MEDICINE CLINIC | Facility: CLINIC | Age: 33
End: 2018-08-13
Payer: COMMERCIAL

## 2018-08-13 VITALS
BODY MASS INDEX: 33.3 KG/M2 | WEIGHT: 232.6 LBS | DIASTOLIC BLOOD PRESSURE: 94 MMHG | SYSTOLIC BLOOD PRESSURE: 156 MMHG | HEIGHT: 70 IN

## 2018-08-13 DIAGNOSIS — E66.9 OBESITY (BMI 30-39.9): ICD-10-CM

## 2018-08-13 DIAGNOSIS — M77.11 LATERAL EPICONDYLITIS OF RIGHT ELBOW: ICD-10-CM

## 2018-08-13 DIAGNOSIS — Z13.220 SCREENING FOR HYPERLIPIDEMIA: ICD-10-CM

## 2018-08-13 DIAGNOSIS — M54.50 LOW BACK PAIN WITHOUT SCIATICA, UNSPECIFIED BACK PAIN LATERALITY, UNSPECIFIED CHRONICITY: Primary | ICD-10-CM

## 2018-08-13 PROCEDURE — 99214 OFFICE O/P EST MOD 30 MIN: CPT | Performed by: FAMILY MEDICINE

## 2018-08-13 RX ORDER — CYCLOBENZAPRINE HCL 10 MG
10 TABLET ORAL EVERY 12 HOURS PRN
Qty: 30 TABLET | Refills: 0 | Status: SHIPPED | OUTPATIENT
Start: 2018-08-13 | End: 2019-08-30 | Stop reason: ALTCHOICE

## 2018-08-13 RX ORDER — MELOXICAM 7.5 MG/1
7.5 TABLET ORAL DAILY PRN
COMMUNITY
End: 2019-08-30 | Stop reason: SDUPTHER

## 2018-08-13 NOTE — PROGRESS NOTES
Assessment/Plan:  Chief Complaint   Patient presents with    Back Pain     stared on Friday when bending down  Felt something pull and taking motrin without improvement  Patient Instructions   Rest left low back and use Meloxicam prn pain as directed and also Flexeril 10 mg 1 po BID prn muscle spasm prn and recheck with labs and general PE in 10 days for f-up  Use Fito Felix prn muscle spasm and left low back pain  Lose weight as directed for obesity and use meloxicam prn right tennis elbow  No problem-specific Assessment & Plan notes found for this encounter  Diagnoses and all orders for this visit:    Low back pain without sciatica, unspecified back pain laterality, unspecified chronicity  -     Comprehensive metabolic panel; Future  -     CBC and differential; Future  -     cyclobenzaprine (FLEXERIL) 10 mg tablet; Take 1 tablet (10 mg total) by mouth every 12 (twelve) hours as needed for muscle spasms    Obesity (BMI 30-39 9)  -     Comprehensive metabolic panel; Future    Lateral epicondylitis of right elbow  -     CBC and differential; Future    Screening for hyperlipidemia  -     Comprehensive metabolic panel; Future  -     Lipid Panel with Direct LDL reflex; Future    Other orders  -     meloxicam (MOBIC) 7 5 mg tablet; Take 7 5 mg by mouth daily as needed          Subjective:      Patient ID: Darian Yadav is a 28 y o  male  Back Pain (stared on Friday when bending down  Felt something pull and taking motrin without improvement  ) No other complaints, no hematuria or rectal bleeding, strained low leftr back picking up a cell phone  He has a right tennis elbow and has meloxicam at home         Back Pain         The following portions of the patient's history were reviewed and updated as appropriate: allergies, current medications, past family history, past medical history, past social history, past surgical history and problem list     Review of Systems   Constitutional: Negative  HENT: Negative  Eyes: Negative  Respiratory: Negative  Cardiovascular: Negative  Gastrointestinal: Negative  Endocrine: Negative  Genitourinary: Negative  Musculoskeletal: Positive for back pain (left low back )  Right tennis elbow  Skin: Negative  Allergic/Immunologic: Negative  Neurological: Negative  Hematological: Negative  Psychiatric/Behavioral: Negative  Objective:      /94   Ht 5' 10" (1 778 m)   Wt 106 kg (232 lb 9 6 oz)   BMI 33 37 kg/m²          Physical Exam   Constitutional: He is oriented to person, place, and time  He appears well-developed and well-nourished  HENT:   Head: Normocephalic and atraumatic  Right Ear: External ear normal    Left Ear: External ear normal    Nose: Nose normal    Mouth/Throat: Oropharynx is clear and moist    Eyes: Conjunctivae and EOM are normal  Pupils are equal, round, and reactive to light  Neck: Normal range of motion  Neck supple  Cardiovascular: Normal rate, regular rhythm, normal heart sounds and intact distal pulses  Pulmonary/Chest: Effort normal and breath sounds normal    Musculoskeletal: Normal range of motion  Left low back pain and no radiculopathy  Right tennis elbow   Neurological: He is alert and oriented to person, place, and time  He has normal reflexes  Skin: Skin is warm and dry  Psychiatric: He has a normal mood and affect   His behavior is normal

## 2018-08-15 LAB
ALBUMIN SERPL-MCNC: 4.2 G/DL (ref 3.6–5.1)
ALBUMIN/GLOB SERPL: 1.4 (CALC) (ref 1–2.5)
ALP SERPL-CCNC: 69 U/L (ref 40–115)
ALT SERPL-CCNC: 22 U/L (ref 9–46)
AST SERPL-CCNC: 19 U/L (ref 10–40)
BASOPHILS # BLD AUTO: 27 CELLS/UL (ref 0–200)
BASOPHILS NFR BLD AUTO: 0.4 %
BILIRUB SERPL-MCNC: 0.8 MG/DL (ref 0.2–1.2)
BUN SERPL-MCNC: 11 MG/DL (ref 7–25)
BUN/CREAT SERPL: NORMAL (CALC) (ref 6–22)
CALCIUM SERPL-MCNC: 9.1 MG/DL (ref 8.6–10.3)
CHLORIDE SERPL-SCNC: 103 MMOL/L (ref 98–110)
CHOLEST SERPL-MCNC: 161 MG/DL
CHOLEST/HDLC SERPL: 5.2 (CALC)
CO2 SERPL-SCNC: 26 MMOL/L (ref 20–32)
CREAT SERPL-MCNC: 0.87 MG/DL (ref 0.6–1.35)
EOSINOPHIL # BLD AUTO: 143 CELLS/UL (ref 15–500)
EOSINOPHIL NFR BLD AUTO: 2.1 %
ERYTHROCYTE [DISTWIDTH] IN BLOOD BY AUTOMATED COUNT: 12.5 % (ref 11–15)
GLOBULIN SER CALC-MCNC: 3 G/DL (CALC) (ref 1.9–3.7)
GLUCOSE SERPL-MCNC: 85 MG/DL (ref 65–99)
HCT VFR BLD AUTO: 44.9 % (ref 38.5–50)
HDLC SERPL-MCNC: 31 MG/DL
HGB BLD-MCNC: 15.4 G/DL (ref 13.2–17.1)
LDLC SERPL CALC-MCNC: 110 MG/DL (CALC)
LYMPHOCYTES # BLD AUTO: 2244 CELLS/UL (ref 850–3900)
LYMPHOCYTES NFR BLD AUTO: 33 %
MCH RBC QN AUTO: 31.6 PG (ref 27–33)
MCHC RBC AUTO-ENTMCNC: 34.3 G/DL (ref 32–36)
MCV RBC AUTO: 92 FL (ref 80–100)
MONOCYTES # BLD AUTO: 537 CELLS/UL (ref 200–950)
MONOCYTES NFR BLD AUTO: 7.9 %
NEUTROPHILS # BLD AUTO: 3849 CELLS/UL (ref 1500–7800)
NEUTROPHILS NFR BLD AUTO: 56.6 %
NONHDLC SERPL-MCNC: 130 MG/DL (CALC)
PLATELET # BLD AUTO: 239 THOUSAND/UL (ref 140–400)
PMV BLD REES-ECKER: 10 FL (ref 7.5–12.5)
POTASSIUM SERPL-SCNC: 4.2 MMOL/L (ref 3.5–5.3)
PROT SERPL-MCNC: 7.2 G/DL (ref 6.1–8.1)
RBC # BLD AUTO: 4.88 MILLION/UL (ref 4.2–5.8)
SL AMB EGFR AFRICAN AMERICAN: 131 ML/MIN/1.73M2
SL AMB EGFR NON AFRICAN AMERICAN: 113 ML/MIN/1.73M2
SODIUM SERPL-SCNC: 138 MMOL/L (ref 135–146)
TRIGL SERPL-MCNC: 95 MG/DL
WBC # BLD AUTO: 6.8 THOUSAND/UL (ref 3.8–10.8)

## 2018-08-24 ENCOUNTER — OFFICE VISIT (OUTPATIENT)
Dept: FAMILY MEDICINE CLINIC | Facility: CLINIC | Age: 33
End: 2018-08-24
Payer: COMMERCIAL

## 2018-08-24 VITALS
HEIGHT: 70 IN | WEIGHT: 231.4 LBS | BODY MASS INDEX: 33.13 KG/M2 | DIASTOLIC BLOOD PRESSURE: 90 MMHG | SYSTOLIC BLOOD PRESSURE: 130 MMHG

## 2018-08-24 DIAGNOSIS — E78.6 LOW HDL (UNDER 40): Primary | ICD-10-CM

## 2018-08-24 DIAGNOSIS — E66.9 OBESITY (BMI 30-39.9): ICD-10-CM

## 2018-08-24 DIAGNOSIS — R03.0 ELEVATED BP WITHOUT DIAGNOSIS OF HYPERTENSION: ICD-10-CM

## 2018-08-24 DIAGNOSIS — Z00.00 HEALTH CARE MAINTENANCE: ICD-10-CM

## 2018-08-24 PROCEDURE — 99395 PREV VISIT EST AGE 18-39: CPT | Performed by: FAMILY MEDICINE

## 2018-08-24 NOTE — PROGRESS NOTES
Assessment/Plan:  Chief Complaint   Patient presents with    Physical Exam     review labs    Follow-up    Back Pain     much better     Patient Instructions   Here for general PE and back pain is resolved  Pt  Encouraged to lose weight and encouraged to diet and exercise  Labs reviewed  Rec  Exercise to increase HDL  Monitor BP as diastolic BP was elevated today  No problem-specific Assessment & Plan notes found for this encounter  Diagnoses and all orders for this visit:    Low HDL (under 40)  -     Comprehensive metabolic panel; Future  -     Lipid Panel with Direct LDL reflex; Future    Health care maintenance    Obesity (BMI 30-39  9)    Elevated BP without diagnosis of hypertension  -     Comprehensive metabolic panel; Future          Subjective:      Patient ID: Tamika Hu is a 28 y o  male  Here for Genral PE and has elevated BP and has family hx of HTN in mother and father  He has obesity  Back pain is much better           The following portions of the patient's history were reviewed and updated as appropriate: allergies, current medications, past family history, past medical history, past social history, past surgical history and problem list     Review of Systems      Objective:      /90   Ht 5' 9 5" (1 765 m)   Wt 105 kg (231 lb 6 4 oz)   BMI 33 68 kg/m²          Physical Exam

## 2018-08-24 NOTE — PATIENT INSTRUCTIONS
Here for general PE and back pain is resolved  Pt  Encouraged to lose weight and encouraged to diet and exercise  Labs reviewed  Rec  Exercise to increase HDL  Monitor BP as diastolic BP was elevated today

## 2019-08-30 ENCOUNTER — OFFICE VISIT (OUTPATIENT)
Dept: FAMILY MEDICINE CLINIC | Facility: CLINIC | Age: 34
End: 2019-08-30
Payer: COMMERCIAL

## 2019-08-30 VITALS
DIASTOLIC BLOOD PRESSURE: 84 MMHG | OXYGEN SATURATION: 96 % | SYSTOLIC BLOOD PRESSURE: 146 MMHG | RESPIRATION RATE: 17 BRPM | TEMPERATURE: 98.5 F | BODY MASS INDEX: 32.5 KG/M2 | HEIGHT: 70 IN | HEART RATE: 112 BPM | WEIGHT: 227 LBS

## 2019-08-30 DIAGNOSIS — M77.11 LATERAL EPICONDYLITIS OF RIGHT ELBOW: ICD-10-CM

## 2019-08-30 DIAGNOSIS — Z13.29 SCREENING FOR THYROID DISORDER: ICD-10-CM

## 2019-08-30 DIAGNOSIS — Z76.89 ENCOUNTER TO ESTABLISH CARE: ICD-10-CM

## 2019-08-30 DIAGNOSIS — Z13.1 SCREENING FOR DIABETES MELLITUS: ICD-10-CM

## 2019-08-30 DIAGNOSIS — R03.0 ELEVATED BLOOD-PRESSURE READING WITHOUT DIAGNOSIS OF HYPERTENSION: Primary | ICD-10-CM

## 2019-08-30 DIAGNOSIS — Z13.6 SCREENING FOR CARDIOVASCULAR CONDITION: ICD-10-CM

## 2019-08-30 DIAGNOSIS — Z13.220 SCREENING FOR HYPERLIPIDEMIA: ICD-10-CM

## 2019-08-30 DIAGNOSIS — E78.6 LOW HDL (UNDER 40): ICD-10-CM

## 2019-08-30 PROBLEM — M76.30 ILIOTIBIAL BAND SYNDROME: Status: ACTIVE | Noted: 2017-10-04

## 2019-08-30 PROBLEM — M25.469 KNEE JOINT EFFUSION: Status: ACTIVE | Noted: 2019-08-30

## 2019-08-30 PROCEDURE — 99214 OFFICE O/P EST MOD 30 MIN: CPT | Performed by: NURSE PRACTITIONER

## 2019-08-30 RX ORDER — MELOXICAM 7.5 MG/1
7.5 TABLET ORAL DAILY PRN
Qty: 30 TABLET | Refills: 0 | Status: SHIPPED | OUTPATIENT
Start: 2019-08-30 | End: 2020-07-07 | Stop reason: SDUPTHER

## 2019-08-30 NOTE — PROGRESS NOTES
Counts include 234 beds at the [facility]    ASSESSMENT AND PLAN     1  Elevated blood-pressure reading without diagnosis of hypertension  Presents today to establish primary care in this office and discuss his blood pressure  Notes it has been elevated lately  146/84 upon rooming  Repeat 148/90  Last blood pressure in system 08/2018:130/90  Denies any chest pain/pressure/SOB  Recommend patient take his blood pressure at home 1-2 times daily over the next 2 weeks  Return for annual physical at that time  Bring machine and readings to visit for comparison  Will likely need to initiate blood pressure medication  Likely start hydrochlorothiazide  Patient to obtain fasting lab work prior as well  Return sooner if needed    2  Low HDL (under 40)  - Lipid panel; Future    3  Screening for hyperlipidemia    - Lipid panel; Future    4  Screening for thyroid disorder    - TSH, 3rd generation with Free T4 reflex; Future    5  Screening for cardiovascular condition    - CBC and differential; Future    6  Screening for diabetes mellitus    - Comprehensive metabolic panel; Future    7  Lateral epicondylitis of right elbow  Patient will occasionally get recurrent right elbow pain  Takes meloxicam 7 5 as needed for same  Will renew Rx at this time  Continue to monitor    - meloxicam (MOBIC) 7 5 mg tablet; Take 1 tablet (7 5 mg total) by mouth daily as needed for moderate pain  Dispense: 30 tablet; Refill: 0    8  Encounter to establish care  Establishing primary care in this office  SUBJECTIVE       Patient ID: Darian Yadav is a 27 y o  male  Chief Complaint   Patient presents with    Establish Care     BP check        HISTORY OF PRESENT ILLNESS    Patient presents today to establish primary care in this office  He would also like to discuss his blood pressure  He has checked it periodically at home, his wife is a nurse in the network, and notes that it is elevated at times    Does not take it routinely, nor does he have readings  Denies any chest pain/pressure/SOB  Does have family history of hypertension  Has noted that he has gained some weight over the last few years  He has been out on disability in was not working for some time  Thinks perhaps his weight may be affecting his pressure as well  The following portions of the patient's history were reviewed and updated as appropriate: allergies, current medications, past family history, past medical history, past social history, past surgical history and problem list     REVIEW OF SYSTEMS  Review of Systems   Constitutional: Negative  Respiratory: Negative  Cardiovascular: Negative  Neurological: Negative  Psychiatric/Behavioral: Negative  OBJECTIVE      VITAL SIGNS  /84 (BP Location: Right arm, Patient Position: Sitting, Cuff Size: Large)   Pulse (!) 112   Temp 98 5 °F (36 9 °C) (Tympanic)   Resp 17   Ht 5' 9 5" (1 765 m)   Wt 103 kg (227 lb)   SpO2 96%   BMI 33 04 kg/m²     CURRENT MEDICATIONS    Current Outpatient Medications:     meloxicam (MOBIC) 7 5 mg tablet, Take 1 tablet (7 5 mg total) by mouth daily as needed for moderate pain, Disp: 30 tablet, Rfl: 0      PHYSICAL EXAMINATION   Physical Exam   Constitutional: He is oriented to person, place, and time  He appears well-developed and well-nourished  Cardiovascular: Normal rate and regular rhythm  Pulmonary/Chest: Effort normal and breath sounds normal  No respiratory distress  Neurological: He is alert and oriented to person, place, and time  Psychiatric: He has a normal mood and affect  Nursing note and vitals reviewed

## 2019-08-31 ENCOUNTER — APPOINTMENT (OUTPATIENT)
Dept: LAB | Facility: CLINIC | Age: 34
End: 2019-08-31
Payer: COMMERCIAL

## 2019-08-31 DIAGNOSIS — Z13.6 SCREENING FOR CARDIOVASCULAR CONDITION: ICD-10-CM

## 2019-08-31 DIAGNOSIS — Z13.1 SCREENING FOR DIABETES MELLITUS: ICD-10-CM

## 2019-08-31 DIAGNOSIS — E78.6 LOW HDL (UNDER 40): ICD-10-CM

## 2019-08-31 DIAGNOSIS — Z13.29 SCREENING FOR THYROID DISORDER: ICD-10-CM

## 2019-08-31 DIAGNOSIS — Z13.220 SCREENING FOR HYPERLIPIDEMIA: ICD-10-CM

## 2019-08-31 LAB
ALBUMIN SERPL BCP-MCNC: 3.9 G/DL (ref 3.5–5)
ALP SERPL-CCNC: 73 U/L (ref 46–116)
ALT SERPL W P-5'-P-CCNC: 34 U/L (ref 12–78)
ANION GAP SERPL CALCULATED.3IONS-SCNC: 5 MMOL/L (ref 4–13)
AST SERPL W P-5'-P-CCNC: 25 U/L (ref 5–45)
BASOPHILS # BLD AUTO: 0.03 THOUSANDS/ΜL (ref 0–0.1)
BASOPHILS NFR BLD AUTO: 0 % (ref 0–1)
BILIRUB SERPL-MCNC: 0.82 MG/DL (ref 0.2–1)
BUN SERPL-MCNC: 11 MG/DL (ref 5–25)
CALCIUM SERPL-MCNC: 9 MG/DL (ref 8.3–10.1)
CHLORIDE SERPL-SCNC: 105 MMOL/L (ref 100–108)
CHOLEST SERPL-MCNC: 147 MG/DL (ref 50–200)
CO2 SERPL-SCNC: 27 MMOL/L (ref 21–32)
CREAT SERPL-MCNC: 0.86 MG/DL (ref 0.6–1.3)
EOSINOPHIL # BLD AUTO: 0.15 THOUSAND/ΜL (ref 0–0.61)
EOSINOPHIL NFR BLD AUTO: 2 % (ref 0–6)
ERYTHROCYTE [DISTWIDTH] IN BLOOD BY AUTOMATED COUNT: 12.2 % (ref 11.6–15.1)
GFR SERPL CREATININE-BSD FRML MDRD: 112 ML/MIN/1.73SQ M
GLUCOSE P FAST SERPL-MCNC: 78 MG/DL (ref 65–99)
HCT VFR BLD AUTO: 45.9 % (ref 36.5–49.3)
HDLC SERPL-MCNC: 33 MG/DL (ref 40–60)
HGB BLD-MCNC: 15.3 G/DL (ref 12–17)
IMM GRANULOCYTES # BLD AUTO: 0.02 THOUSAND/UL (ref 0–0.2)
IMM GRANULOCYTES NFR BLD AUTO: 0 % (ref 0–2)
LDLC SERPL CALC-MCNC: 89 MG/DL (ref 0–100)
LYMPHOCYTES # BLD AUTO: 2.35 THOUSANDS/ΜL (ref 0.6–4.47)
LYMPHOCYTES NFR BLD AUTO: 33 % (ref 14–44)
MCH RBC QN AUTO: 31.4 PG (ref 26.8–34.3)
MCHC RBC AUTO-ENTMCNC: 33.3 G/DL (ref 31.4–37.4)
MCV RBC AUTO: 94 FL (ref 82–98)
MONOCYTES # BLD AUTO: 0.65 THOUSAND/ΜL (ref 0.17–1.22)
MONOCYTES NFR BLD AUTO: 9 % (ref 4–12)
NEUTROPHILS # BLD AUTO: 3.85 THOUSANDS/ΜL (ref 1.85–7.62)
NEUTS SEG NFR BLD AUTO: 56 % (ref 43–75)
NONHDLC SERPL-MCNC: 114 MG/DL
NRBC BLD AUTO-RTO: 0 /100 WBCS
PLATELET # BLD AUTO: 212 THOUSANDS/UL (ref 149–390)
PMV BLD AUTO: 10.5 FL (ref 8.9–12.7)
POTASSIUM SERPL-SCNC: 4.1 MMOL/L (ref 3.5–5.3)
PROT SERPL-MCNC: 7.9 G/DL (ref 6.4–8.2)
RBC # BLD AUTO: 4.87 MILLION/UL (ref 3.88–5.62)
SODIUM SERPL-SCNC: 137 MMOL/L (ref 136–145)
TRIGL SERPL-MCNC: 123 MG/DL
TSH SERPL DL<=0.05 MIU/L-ACNC: 1.45 UIU/ML (ref 0.36–3.74)
WBC # BLD AUTO: 7.05 THOUSAND/UL (ref 4.31–10.16)

## 2019-08-31 PROCEDURE — 36415 COLL VENOUS BLD VENIPUNCTURE: CPT

## 2019-08-31 PROCEDURE — 85025 COMPLETE CBC W/AUTO DIFF WBC: CPT

## 2019-08-31 PROCEDURE — 80061 LIPID PANEL: CPT

## 2019-08-31 PROCEDURE — 84443 ASSAY THYROID STIM HORMONE: CPT

## 2019-08-31 PROCEDURE — 80053 COMPREHEN METABOLIC PANEL: CPT

## 2019-09-09 ENCOUNTER — OFFICE VISIT (OUTPATIENT)
Dept: FAMILY MEDICINE CLINIC | Facility: CLINIC | Age: 34
End: 2019-09-09
Payer: COMMERCIAL

## 2019-09-09 VITALS
SYSTOLIC BLOOD PRESSURE: 140 MMHG | BODY MASS INDEX: 33.06 KG/M2 | HEIGHT: 69 IN | DIASTOLIC BLOOD PRESSURE: 92 MMHG | HEART RATE: 85 BPM | OXYGEN SATURATION: 98 % | WEIGHT: 223.2 LBS | TEMPERATURE: 97.9 F

## 2019-09-09 DIAGNOSIS — Z00.00 PREVENTATIVE HEALTH CARE: Primary | ICD-10-CM

## 2019-09-09 DIAGNOSIS — I10 HYPERTENSION, UNSPECIFIED TYPE: ICD-10-CM

## 2019-09-09 PROCEDURE — 99395 PREV VISIT EST AGE 18-39: CPT | Performed by: NURSE PRACTITIONER

## 2019-09-09 RX ORDER — HYDROCHLOROTHIAZIDE 25 MG/1
25 TABLET ORAL DAILY
Qty: 90 TABLET | Refills: 0 | Status: SHIPPED | OUTPATIENT
Start: 2019-09-09 | End: 2019-12-19 | Stop reason: SDUPTHER
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
## 2019-10-10 ENCOUNTER — OFFICE VISIT (OUTPATIENT)
Dept: FAMILY MEDICINE CLINIC | Facility: CLINIC | Age: 34
End: 2019-10-10
Payer: COMMERCIAL

## 2019-10-10 VITALS
BODY MASS INDEX: 32.73 KG/M2 | WEIGHT: 221 LBS | SYSTOLIC BLOOD PRESSURE: 148 MMHG | TEMPERATURE: 97.4 F | HEART RATE: 80 BPM | OXYGEN SATURATION: 99 % | HEIGHT: 69 IN | DIASTOLIC BLOOD PRESSURE: 96 MMHG

## 2019-10-10 DIAGNOSIS — I10 ESSENTIAL HYPERTENSION: Primary | ICD-10-CM

## 2019-10-10 DIAGNOSIS — R07.89 FEELING OF CHEST TIGHTNESS: ICD-10-CM

## 2019-10-10 DIAGNOSIS — R00.2 PALPITATIONS: ICD-10-CM

## 2019-10-10 DIAGNOSIS — Z23 NEED FOR INFLUENZA VACCINATION: ICD-10-CM

## 2019-10-10 PROCEDURE — 99213 OFFICE O/P EST LOW 20 MIN: CPT | Performed by: NURSE PRACTITIONER

## 2019-10-10 PROCEDURE — 93000 ELECTROCARDIOGRAM COMPLETE: CPT | Performed by: NURSE PRACTITIONER

## 2019-10-10 PROCEDURE — 90471 IMMUNIZATION ADMIN: CPT | Performed by: NURSE PRACTITIONER

## 2019-10-10 PROCEDURE — 90686 IIV4 VACC NO PRSV 0.5 ML IM: CPT | Performed by: NURSE PRACTITIONER

## 2019-10-10 PROCEDURE — 3008F BODY MASS INDEX DOCD: CPT | Performed by: NURSE PRACTITIONER

## 2019-10-10 RX ORDER — LISINOPRIL 10 MG/1
10 TABLET ORAL DAILY
Qty: 90 TABLET | Refills: 0 | Status: SHIPPED | OUTPATIENT
Start: 2019-10-10 | End: 2019-12-19 | Stop reason: SDUPTHER

## 2019-10-10 NOTE — PROGRESS NOTES
[facility]    ASSESSMENT AND PLAN     1  Essential hypertension  Presents today for a recheck of his BP:  148/96  Repeat 140/88  Compliant with hydrochlorothiazide daily  Home BP is running 150 over 90s  Added lisinopril 10 mg daily  Dose and use reviewed  Complains today of getting occasional chest pressure and tightness when his BP is elevated  Not mentioned in previous visits  States can take several minutes to resolve  Complains of occasional palpitations  Assessment today unremarkable  In office EKG:  NSR  Maternal family history of cardiac disease  Assess further with stress echo  ER precautions reviewed    - POCT ECG  - lisinopril (ZESTRIL) 10 mg tablet; Take 1 tablet (10 mg total) by mouth daily  Dispense: 90 tablet; Refill: 0    2  Feeling of chest tightness    - Echo stress test w contrast if indicated; Future    3  Need for influenza vaccination  Administered today    - influenza vaccine, [id number], quadrivalent, 0 5 mL, preservative-free, for adult and pediatric patients 6 mos+ (AFLURIA, FLUARIX, FLULAVAL, FLUZONE)    4  Palpitations    - Echo stress test w contrast if indicated; Future            SUBJECTIVE       Patient ID: Lina Goldman is a 27 y o  male  Chief Complaint   Patient presents with    Blood Pressure Check     Elevated blood pressure, has been checking at home and still running high        HISTORY OF PRESENT ILLNESS    Patient presents today for one-month follow-up to high blood pressure  Has been checking his pressure at home  It has been running 150s over 90s  Has been taking his hydrochlorothiazide without issue  A not noted any adverse side effects  Does note some occasional increased urination in the mornings  States he has been getting intermittent periods of chest pressure/tightness  It has happened with both exertion and sitting  The chest pressure/tightness lasts only a few minutes and resolves if he sits and relaxes    States it has happened 2 or 3 times in the last few weeks  Occurred 1 or 2 times a day the last few months as well  Did not mention at his last visit  States he gets get occasional palpitations as well  He just recently went back to work  He does note that he can get mildly short of breath with walking up stairs at times  Does not drink excessive caffeine  States he has roughly 2 cups of coffee per day  Denies any energy drinks and or soda  Denies any headaches lightheaded or dizziness and/or extremity weakness        The following portions of the patient's history were reviewed and updated as appropriate: allergies, current medications, past family history, past medical history, past social history, past surgical history and problem list     REVIEW OF SYSTEMS  Review of Systems   Respiratory: Positive for shortness of breath (Since starting back to work, walking upstairs)  Negative for wheezing  Cardiovascular: Positive for palpitations  Negative for leg swelling  Chest pain: Chest tightness  Neurological: Negative  OBJECTIVE      VITAL SIGNS  /96 (BP Location: Right arm, Patient Position: Sitting, Cuff Size: Adult)   Pulse 80   Temp (!) 97 4 °F (36 3 °C)   Ht 5' 8 5" (1 74 m)   Wt 100 kg (221 lb)   SpO2 99%   BMI 33 11 kg/m²     CURRENT MEDICATIONS    Current Outpatient Medications:     hydrochlorothiazide (HYDRODIURIL) 25 mg tablet, Take 1 tablet (25 mg total) by mouth daily, Disp: 90 tablet, Rfl: 0    meloxicam (MOBIC) 7 5 mg tablet, Take 1 tablet (7 5 mg total) by mouth daily as needed for moderate pain, Disp: 30 tablet, Rfl: 0    lisinopril (ZESTRIL) 10 mg tablet, Take 1 tablet (10 mg total) by mouth daily, Disp: 90 tablet, Rfl: 0      PHYSICAL EXAMINATION   Physical Exam   Constitutional: He is oriented to person, place, and time  He appears well-developed and well-nourished  Cardiovascular: Normal rate, regular rhythm and normal heart sounds     Negative for extremity edema Pulmonary/Chest: Effort normal and breath sounds normal  No respiratory distress  He has no wheezes  He has no rhonchi  He has no rales  Neurological: He is alert and oriented to person, place, and time  Psychiatric: He has a normal mood and affect  Nursing note and vitals reviewed

## 2019-10-22 ENCOUNTER — HOSPITAL ENCOUNTER (OUTPATIENT)
Dept: NON INVASIVE DIAGNOSTICS | Facility: HOSPITAL | Age: 34
Discharge: HOME | End: 2019-10-22
Payer: COMMERCIAL

## 2019-10-22 VITALS
SYSTOLIC BLOOD PRESSURE: 130 MMHG | BODY MASS INDEX: 33.49 KG/M2 | HEIGHT: 68 IN | WEIGHT: 221 LBS | DIASTOLIC BLOOD PRESSURE: 70 MMHG

## 2019-10-22 DIAGNOSIS — R07.89 FEELING OF CHEST TIGHTNESS: ICD-10-CM

## 2019-10-22 DIAGNOSIS — R00.2 PALPITATIONS: ICD-10-CM

## 2019-10-22 PROCEDURE — 93350 STRESS TTE ONLY: CPT

## 2019-10-23 LAB
CHEST PAIN STATEMENT: NORMAL
MAX DIASTOLIC BP: 50 MMHG
MAX HEART RATE: 193 BPM
MAX PREDICTED HEART RATE: 185 BPM
MAX. SYSTOLIC BP: 164 MMHG
PROTOCOL NAME: NORMAL
REASON FOR TERMINATION: NORMAL
TARGET HR FORMULA: NORMAL
TIME IN EXERCISE PHASE: NORMAL

## 2019-10-23 PROCEDURE — 93351 STRESS TTE COMPLETE: CPT | Performed by: INTERNAL MEDICINE

## 2019-11-07 ENCOUNTER — TELEPHONE (OUTPATIENT)
Dept: FAMILY MEDICINE CLINIC | Facility: CLINIC | Age: 34
End: 2019-11-07

## 2019-11-07 ENCOUNTER — CLINICAL SUPPORT (OUTPATIENT)
Dept: FAMILY MEDICINE CLINIC | Facility: CLINIC | Age: 34
End: 2019-11-07

## 2019-11-07 VITALS — DIASTOLIC BLOOD PRESSURE: 68 MMHG | SYSTOLIC BLOOD PRESSURE: 130 MMHG

## 2019-11-07 DIAGNOSIS — I10 HYPERTENSION, UNSPECIFIED TYPE: Primary | ICD-10-CM

## 2019-11-21 NOTE — TELEPHONE ENCOUNTER
BP noted  No further changes will be made to treatment at this time  Contacted patient with EKG/echo results  Noted on results

## 2019-12-17 ENCOUNTER — TELEPHONE (OUTPATIENT)
Dept: FAMILY MEDICINE CLINIC | Facility: CLINIC | Age: 34
End: 2019-12-17

## 2019-12-17 NOTE — TELEPHONE ENCOUNTER
Patient is doing well on  The Lisinopril 10mg and HCTZ 25 mg  He said if he did well on them, you would call in a refill for the joint med/combining both  Please call to [facility]      Please call Candido Sosa after it has been called in       thanks

## 2019-12-18 DIAGNOSIS — I10 ESSENTIAL HYPERTENSION: ICD-10-CM

## 2019-12-18 DIAGNOSIS — I10 HYPERTENSION, UNSPECIFIED TYPE: ICD-10-CM

## 2019-12-18 RX ORDER — LISINOPRIL 10 MG/1
10 TABLET ORAL DAILY
Qty: 90 TABLET | Refills: 0 | OUTPATIENT
Start: 2019-12-18

## 2019-12-18 NOTE — TELEPHONE ENCOUNTER
Please call patient  I saw another phone message that he wanted the combination lisinopril and hydrochlorothiazide  Unfortunately it does not come in 10 of lisinopril and 25 of hydrochlorothiazide  This should be the dose he is currently on  Please verify that he is taking these dosing amounts  I will then send prescriptions but, unfortunately it will likely have to be 2 separate ones

## 2019-12-19 RX ORDER — LISINOPRIL 10 MG/1
10 TABLET ORAL DAILY
Qty: 90 TABLET | Refills: 0 | Status: SHIPPED | OUTPATIENT
Start: 2019-12-19 | End: 2020-03-20 | Stop reason: SDUPTHER

## 2019-12-19 RX ORDER — HYDROCHLOROTHIAZIDE 25 MG/1
25 TABLET ORAL DAILY
Qty: 90 TABLET | Refills: 0 | Status: SHIPPED | OUTPATIENT
Start: 2019-12-19 | End: 2020-03-20 | Stop reason: SDUPTHER

## 2019-12-19 NOTE — TELEPHONE ENCOUNTER
Patient states he is still taking Lisinopril 10 mg and HCTZ 25 mg   I informed him it would be sent in 2 different prescriptions  He states that is fine, but he needs it right away

## 2020-03-20 DIAGNOSIS — I10 ESSENTIAL HYPERTENSION: ICD-10-CM

## 2020-03-20 DIAGNOSIS — I10 HYPERTENSION, UNSPECIFIED TYPE: ICD-10-CM

## 2020-03-20 RX ORDER — HYDROCHLOROTHIAZIDE 25 MG/1
25 TABLET ORAL DAILY
Qty: 90 TABLET | Refills: 0 | Status: SHIPPED | OUTPATIENT
Start: 2020-03-20 | End: 2020-07-07 | Stop reason: SDUPTHER

## 2020-03-20 RX ORDER — LISINOPRIL 10 MG/1
10 TABLET ORAL DAILY
Qty: 90 TABLET | Refills: 0 | Status: SHIPPED | OUTPATIENT
Start: 2020-03-20 | End: 2020-07-07 | Stop reason: SDUPTHER

## 2020-03-20 NOTE — TELEPHONE ENCOUNTER
Please call patient back  I did renew his blood pressure medications, but ask him to clarify what he means by "good"  Ask what the specific numbers are, so we can document that in his record    Thank you

## 2020-03-20 NOTE — TELEPHONE ENCOUNTER
Last OV 10/10/19  No future appts  Patient states he has been checking his BP at home and has been good

## 2020-07-03 DIAGNOSIS — I10 ESSENTIAL HYPERTENSION: ICD-10-CM

## 2020-07-03 DIAGNOSIS — I10 HYPERTENSION, UNSPECIFIED TYPE: ICD-10-CM

## 2020-07-06 RX ORDER — HYDROCHLOROTHIAZIDE 25 MG/1
25 TABLET ORAL DAILY
Qty: 30 TABLET | Refills: 0 | OUTPATIENT
Start: 2020-07-06

## 2020-07-06 RX ORDER — LISINOPRIL 10 MG/1
10 TABLET ORAL DAILY
Qty: 30 TABLET | Refills: 0 | OUTPATIENT
Start: 2020-07-06

## 2020-07-06 NOTE — TELEPHONE ENCOUNTER
A user error has taken place: encounter opened in error, closed for administrative reasons

## 2020-07-07 DIAGNOSIS — I10 HYPERTENSION, UNSPECIFIED TYPE: ICD-10-CM

## 2020-07-07 DIAGNOSIS — M77.11 LATERAL EPICONDYLITIS OF RIGHT ELBOW: ICD-10-CM

## 2020-07-07 DIAGNOSIS — I10 ESSENTIAL HYPERTENSION: ICD-10-CM

## 2020-07-07 RX ORDER — MELOXICAM 7.5 MG/1
7.5 TABLET ORAL DAILY PRN
Qty: 30 TABLET | Refills: 0 | Status: SHIPPED | OUTPATIENT
Start: 2020-07-07 | End: 2021-10-01

## 2020-07-07 RX ORDER — HYDROCHLOROTHIAZIDE 25 MG/1
25 TABLET ORAL DAILY
Qty: 90 TABLET | Refills: 0 | Status: SHIPPED | OUTPATIENT
Start: 2020-07-07 | End: 2020-10-27 | Stop reason: SDUPTHER

## 2020-07-07 RX ORDER — LISINOPRIL 10 MG/1
10 TABLET ORAL DAILY
Qty: 90 TABLET | Refills: 0 | Status: SHIPPED | OUTPATIENT
Start: 2020-07-07 | End: 2020-10-27 | Stop reason: SDUPTHER

## 2020-07-07 NOTE — TELEPHONE ENCOUNTER
Pt called to schedule med check appt with you, and needs a refill on:    - lisinopril (zestril) 10mg tablet  Takes 1 tablet by mouth daily  Qty: 90 tablet    Send to [facility]

## 2020-07-07 NOTE — TELEPHONE ENCOUNTER
Patient scheduled 7/17 with EUGENIO MELO rejected HCTZ and Meloxicam until he was scheduled for a visit

## 2020-07-17 ENCOUNTER — OFFICE VISIT (OUTPATIENT)
Dept: FAMILY MEDICINE CLINIC | Facility: CLINIC | Age: 35
End: 2020-07-17
Payer: COMMERCIAL

## 2020-07-17 ENCOUNTER — APPOINTMENT (OUTPATIENT)
Dept: RADIOLOGY | Facility: CLINIC | Age: 35
End: 2020-07-17
Payer: COMMERCIAL

## 2020-07-17 VITALS
SYSTOLIC BLOOD PRESSURE: 136 MMHG | HEART RATE: 84 BPM | BODY MASS INDEX: 30.12 KG/M2 | HEIGHT: 70 IN | TEMPERATURE: 98 F | DIASTOLIC BLOOD PRESSURE: 84 MMHG | RESPIRATION RATE: 18 BRPM | OXYGEN SATURATION: 97 % | WEIGHT: 210.4 LBS

## 2020-07-17 DIAGNOSIS — G89.29 CHRONIC PAIN OF BOTH KNEES: ICD-10-CM

## 2020-07-17 DIAGNOSIS — M79.601 RIGHT ARM PAIN: ICD-10-CM

## 2020-07-17 DIAGNOSIS — G89.29 CHRONIC PAIN OF BOTH KNEES: Primary | ICD-10-CM

## 2020-07-17 DIAGNOSIS — M25.562 CHRONIC PAIN OF BOTH KNEES: Primary | ICD-10-CM

## 2020-07-17 DIAGNOSIS — R10.32 LEFT INGUINAL PAIN: ICD-10-CM

## 2020-07-17 DIAGNOSIS — M25.561 CHRONIC PAIN OF BOTH KNEES: Primary | ICD-10-CM

## 2020-07-17 DIAGNOSIS — M25.562 CHRONIC PAIN OF BOTH KNEES: ICD-10-CM

## 2020-07-17 DIAGNOSIS — M25.561 CHRONIC PAIN OF BOTH KNEES: ICD-10-CM

## 2020-07-17 PROCEDURE — 3008F BODY MASS INDEX DOCD: CPT | Performed by: FAMILY MEDICINE

## 2020-07-17 PROCEDURE — 99214 OFFICE O/P EST MOD 30 MIN: CPT | Performed by: FAMILY MEDICINE

## 2020-07-17 PROCEDURE — 3075F SYST BP GE 130 - 139MM HG: CPT | Performed by: FAMILY MEDICINE

## 2020-07-17 PROCEDURE — 73562 X-RAY EXAM OF KNEE 3: CPT

## 2020-07-17 PROCEDURE — 1036F TOBACCO NON-USER: CPT | Performed by: FAMILY MEDICINE

## 2020-07-17 PROCEDURE — 3079F DIAST BP 80-89 MM HG: CPT | Performed by: FAMILY MEDICINE
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
## 2020-07-21 ENCOUNTER — EVALUATION (OUTPATIENT)
Dept: PHYSICAL THERAPY | Facility: CLINIC | Age: 35
End: 2020-07-21
Payer: COMMERCIAL

## 2020-07-21 DIAGNOSIS — M25.562 CHRONIC PAIN OF BOTH KNEES: ICD-10-CM

## 2020-07-21 DIAGNOSIS — G89.29 CHRONIC PAIN OF BOTH KNEES: ICD-10-CM

## 2020-07-21 DIAGNOSIS — M25.561 CHRONIC PAIN OF BOTH KNEES: ICD-10-CM

## 2020-07-21 PROCEDURE — 97110 THERAPEUTIC EXERCISES: CPT | Performed by: PHYSICAL THERAPIST

## 2020-07-21 PROCEDURE — 97161 PT EVAL LOW COMPLEX 20 MIN: CPT | Performed by: PHYSICAL THERAPIST

## 2020-07-21 NOTE — PROGRESS NOTES
PT Evaluation     Today's date: 2020  Patient name: Brandan Steward  : 1985  MRN: [id number]  Referring provider: Sukhwinder Cohen DO  Dx:   Encounter Diagnosis     ICD-10-CM    1  Chronic pain of both knees M25 561 Ambulatory referral to Physical Therapy    M25 562     G89 29                   Assessment  Assessment details: Brandan Steward is a 38 y o  male who presents with pain and decreased strength  Due to these impairments, patient has difficulty performing a/iadls, recreational activities and work-related activities  Patient's clinical presentation is consistent with their referring diagnosis of chronic pain of both knees  Patient would benefit from skilled physical therapy to address their aforementioned impairments, improve their level of function and to improve their overall quality of life  Impairments: impaired physical strength, lacks appropriate home exercise program and pain with function    Symptom irritability: moderateUnderstanding of Dx/Px/POC: good   Prognosis: good    Goals  Short term goals - to be achieved in 4 weeks:     Decrease pain 20-50%  Increase strength by 1/2 grade  Long term goals - to be achieved by discharge:    Ambulation is improved to maximal level of function  Squatting is improved to maximal level of function  Stair climbing is improved to maximal level of function  IADL performance in related activities is improved to maximal level of function  Performance in related work activities is improved to maximal level of function       Plan  Planned therapy interventions: manual therapy, neuromuscular re-education, patient education, strengthening, stretching, therapeutic activities, therapeutic exercise and home exercise program  Frequency: 2x week  Duration in visits: 12  Duration in weeks: 6  Plan of Care beginning date: 2020  Plan of Care expiration date: 2020  Treatment plan discussed with: patient        Subjective Evaluation    History of Present Illness  Mechanism of injury: Patient refers to PT with c/o pain in bilateral knees which has been present for greater than five years which has increased in the past six months of insidious onset  Patient received X-rays of bilateral knees on 20; patient has not received results of the test, report not available at this time  Patient reports intermittent numbness in his bilateral knees and distal thigh areas  Patient reports intermittent "giving out" of bilateral knees; states no falls from bilateral LE's giving out  Patient works full time in maintenance; 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 heavy lifting, frequent bending and squatting, and intermittent kneeling  Pain  Current pain ratin  At best pain ratin  At worst pain ratin  Quality: sharp and dull ache  Aggravating factors: walking and stair climbing (Squatting, kneeling)          Objective     Active Range of Motion   Left Knee   Flexion: 130 degrees   Extension: 0 degrees     Right Knee   Flexion: 130 degrees   Extension: 0 degrees     Passive Range of Motion   Left Knee   Flexion: 135 degrees   Extension: 0 degrees     Right Knee   Flexion: 135 degrees   Extension: 0 degrees     Strength/Myotome Testing     Left Hip   Planes of Motion   Flexion: 4- (pain)  Extension: 4- (pain)  Abduction: 4- (pain)  Adduction: 4- (pain)    Right Hip   Planes of Motion   Flexion: 4- (pain)  Extension: 4- (pain)  Abduction: 4- (pain)  Adduction: 4- (pain)    Left Knee   Flexion: 4- (pain)  Extension: 4+  Quadriceps contraction: good    Right Knee   Flexion: 4- (pain)  Extension: 4- (pain)  Quadriceps contraction: good    Tests     Left Knee   Positive medial Erika and Thessaly's test at 20 degrees  Right Knee   Positive medial Erika and Thessaly's test at 20 degrees       General Comments:      Knee Comments  Pain with functional squat  Pain with SLS on bilateral LE's              Precautions: PMH: HTN      Manuals 7/21                                                                Neuro Re-Ed             Step ups             Step downs with ecc   focus             TB TKE                                                                 Ther Ex             Bike             Mini squats HEP            SLR flex HEP            SLR abd HEP            Bridges HEP            SLR add             SLR ext             Leg press             Ther Activity                                       Gait Training                                       Modalities

## 2020-07-23 ENCOUNTER — TELEPHONE (OUTPATIENT)
Dept: FAMILY MEDICINE CLINIC | Facility: CLINIC | Age: 35
End: 2020-07-23

## 2020-07-28 ENCOUNTER — OFFICE VISIT (OUTPATIENT)
Dept: PHYSICAL THERAPY | Facility: CLINIC | Age: 35
End: 2020-07-28
Payer: COMMERCIAL

## 2020-07-28 DIAGNOSIS — G89.29 CHRONIC PAIN OF BOTH KNEES: Primary | ICD-10-CM

## 2020-07-28 DIAGNOSIS — M25.561 CHRONIC PAIN OF BOTH KNEES: Primary | ICD-10-CM

## 2020-07-28 DIAGNOSIS — M25.562 CHRONIC PAIN OF BOTH KNEES: Primary | ICD-10-CM

## 2020-07-28 PROCEDURE — 97140 MANUAL THERAPY 1/> REGIONS: CPT

## 2020-07-28 PROCEDURE — 97110 THERAPEUTIC EXERCISES: CPT

## 2020-07-28 NOTE — PROGRESS NOTES
Daily Note     Today's date: 2020  Patient name: Laurie Scales  : 1985  MRN: [id number]  Referring provider: Azucena Meredith DO  Dx:   Encounter Diagnosis     ICD-10-CM    1  Chronic pain of both knees M25 561     M25 562     G89 29                   Subjective: Still with pain on the lateral aspect of B/L knees  Objective: See treatment diary below  Assessed Patient's cc  Assessment: Patient had tenderness and tightness of distal ITB B/L  This was addressed with STM and stretching  Patient would benefit from continued strengthening and stretching of knees to promote an optimal return to function  Patient noted that standing TKE's "felt good " He did not note any pain during activities  Tolerated treatment well  Patient would benefit from continued PT for an optimal return to function  Plan: Continue per plan of care  Precautions: PMH: HTN      Manuals            STM/IASTM to Distal ITB B/L  KD  STM   4'           ITB Stretch B/L  KD  3x30"           HS Stretch B/L  KD  3x30"                                                               Neuro Re-Ed             Step ups             Step downs with ecc  focus             TB TKE  BTB  30x ea                                                                 Ther Ex             Bike  nv           Mini squats HEP nv           SLR flex HEP 3x10           SLR abd HEP 2x10           Clamshells  OTB  2x10           Bridges HEP OTB  2x12           SLR add  np           SLR ext  np           Leg press Ecc  85#  2x10                                                               Ther Activity                                       Gait Training                                       Modalities

## 2020-07-29 ENCOUNTER — APPOINTMENT (OUTPATIENT)
Dept: PHYSICAL THERAPY | Facility: CLINIC | Age: 35
End: 2020-07-29
Payer: COMMERCIAL

## 2020-07-31 ENCOUNTER — OFFICE VISIT (OUTPATIENT)
Dept: PHYSICAL THERAPY | Facility: CLINIC | Age: 35
End: 2020-07-31
Payer: COMMERCIAL

## 2020-07-31 DIAGNOSIS — M25.561 CHRONIC PAIN OF BOTH KNEES: Primary | ICD-10-CM

## 2020-07-31 DIAGNOSIS — G89.29 CHRONIC PAIN OF BOTH KNEES: Primary | ICD-10-CM

## 2020-07-31 DIAGNOSIS — M25.562 CHRONIC PAIN OF BOTH KNEES: Primary | ICD-10-CM

## 2020-07-31 PROCEDURE — 97110 THERAPEUTIC EXERCISES: CPT

## 2020-07-31 PROCEDURE — 97140 MANUAL THERAPY 1/> REGIONS: CPT

## 2020-07-31 PROCEDURE — 97112 NEUROMUSCULAR REEDUCATION: CPT

## 2020-07-31 NOTE — PROGRESS NOTES
Daily Note     Today's date: 2020  Patient name: Eloise Barker  : 1985  MRN: [id number]  Referring provider: Maya Salazar DO  Dx:   Encounter Diagnosis     ICD-10-CM    1  Chronic pain of both knees M25 561     M25 562     G89 29                   Subjective: Patient admitted having increased soreness and discomfort in bilateral knees after last visit, L more than R  Sometimes wakes in the morning and has numbness in his legs after sleeping on his side  Objective: See treatment diary below  Assessment: With soreness after last visit held on further progressions today  Continued focus on addressing LE flexibility deficits  Used IASTM to distal ITB with good tolerance  Plan to progress NV to further address hip and knee weakness and continued flexibility deficits in bilateral LEs  Plan: Continue per plan of care  Precautions: PMH: HTN    Manuals           STM/IASTM to Distal ITB B/L  KD  STM   4' EH          ITB Stretch B/L  KD  3x30" EH          HS Stretch B/L  KD  3x30" EH                                                              Neuro Re-Ed             Step ups             Step downs with ecc  focus             TB TKE  BTB  30x ea   BTB  3"x30                                                              Ther Ex             Bike  nv 7 min          Mini squats HEP nv NV          SLR flex HEP 3x10 3x10          SLR abd HEP 2x10 NV          Clamshells  OTB  2x10 OTB  2x12          Bridges HEP OTB  2x12 OTB  2x12          SLR add  np           SLR ext  np           Leg press Ecc  85#  2x10 85#  2x10                                                              Ther Activity                                       Gait Training                                       Modalities

## 2020-08-03 ENCOUNTER — OFFICE VISIT (OUTPATIENT)
Dept: OBGYN CLINIC | Facility: MEDICAL CENTER | Age: 35
End: 2020-08-03
Payer: COMMERCIAL

## 2020-08-03 VITALS
BODY MASS INDEX: 30.35 KG/M2 | WEIGHT: 212 LBS | HEIGHT: 70 IN | DIASTOLIC BLOOD PRESSURE: 76 MMHG | HEART RATE: 97 BPM | SYSTOLIC BLOOD PRESSURE: 124 MMHG | TEMPERATURE: 99.1 F

## 2020-08-03 DIAGNOSIS — M79.601 RIGHT ARM PAIN: ICD-10-CM

## 2020-08-03 PROCEDURE — 99244 OFF/OP CNSLTJ NEW/EST MOD 40: CPT | Performed by: ORTHOPAEDIC SURGERY

## 2020-08-03 NOTE — PROGRESS NOTES
Chief Complaint     Right hand numbness      History of Present Illness     Adela Phan is a 38 y o  male right-hand dominant, presents with right hand numbness and elbow discomfort  I am being asked to see him in consultation at the request of Gabriela Echeverria  Patient has a history of right lateral epicondyle release performed by Dr Rachel Orosco on 06/27/2017 as part of a worker's compensation claim  Patient's postoperative course progressed uneventfully and the majority of his lateral epicondylar symptoms have resolved  During his last evaluation by Dr Rachel Orosco in 2017 there were concerns for right cubital tunnel syndrome but the patient did not pursue further workup as it did not follow under his worker's compensation claim  Over the next 3 years, he noted continued worsening of the elbow discomfort with concomitant numbness in his right small ring and long fingers  He also feels a painful clicking sensation on the medial aspect of the elbow with flexion which is associated with right hand numbness  Pain is worse with activity  He is a  and feels that the numbness is interfering with his work  He denies any previous physical therapy or splinting  Denies neck pain or karl weakness of the right upper extremity  No other complaints            Past Medical History:   Diagnosis Date    GERD (gastroesophageal reflux disease)     Hypertension     Lateral epicondylitis        Past Surgical History:   Procedure Laterality Date    APPENDECTOMY      LATERAL EPICONDYLE RELEASE Right 6/27/2017    Procedure: ELBOW LATERAL EPICONDYLAR RELEASE;  Surgeon: Karime Covarrubias MD;  [facility];  Service: Orthopedics       No Known Allergies    Current Outpatient Medications on File Prior to Visit   Medication Sig Dispense Refill    hydrochlorothiazide (HYDRODIURIL) 25 mg tablet Take 1 tablet (25 mg total) by mouth daily 90 tablet 0    lisinopril (ZESTRIL) 10 mg tablet Take 1 tablet (10 mg total) by mouth daily 90 tablet 0    meloxicam (MOBIC) 7 5 mg tablet Take 1 tablet (7 5 mg total) by mouth daily as needed for moderate pain 30 tablet 0     No current facility-administered medications on file prior to visit  Social History     Tobacco Use    Smoking status: Never Smoker    Smokeless tobacco: Never Used   Substance Use Topics    Alcohol use: Yes    Drug use: No       History reviewed  No pertinent family history  Review of Systems     As stated in the HPI  All other systems were reviewed and are negative  Physical Exam     /76   Pulse 97   Temp 99 1 °F (37 3 °C)   Ht 5' 10"   Wt 96 2 kg (212 lb)   BMI 30 42 kg/m²     GENERAL: This is a well-developed, well-nourished, age-appropriate patient in no acute distress  The patient is alert and oriented x3  Pleasant and cooperative  Eyes: Anicteric sclerae  Extraocular movements appear intact  HENT: Nares are patent with no drainage  Lungs: There is equal chest rise on inspection  Breathing is non-labored with no audible wheezing  Cardiovascular: No cyanosis  No upper extremity lymphadema  Skin: Skin is warm to touch  No obvious skin lesions or rashes other than described below  Neurologic: No ataxia  Psychiatric: Mood and affect are appropriate  Right upper extremity:  · Well-healed surgical incision lateral condyle  · Elbow extension limited to about 10° short of neutral compared to the contralateral which hyperextends  Flexion limited to about 10° less than contralateral flexion  Pro supination is slightly limited as well  · Negative Lhermitte and Spurling's    · No wasting of 1st dorsal webspace appreciated  · Negative Wartenberg sign  · Positive Tinel's sign over the cubital tunnel and Guyon's canal  · Negative Tinel's sign over carpal tunnel  · Positive Durkan's compression test  · Subluxation of ulnar nerve over medial epicondyle with elbow flexion   · 4/5 FDP 5  · 5/5 Motor to the APB, FDI, FDP2, EDC  Sensation intact to light touch in the median, radial, and decreased in the ulnar nerve distribution  · Palpable radial and ulnar pulses     Data Review     Results Reviewed     None             Imaging:  None today    Assessment and Plan      Diagnoses and all orders for this visit:    Right arm pain  -     Ambulatory referral to Orthopedic Surgery           19-year-old male with right cubital tunnel syndrome and an unstable ulnar nerve  We discussed the etiology and natural course of cubital tunnel syndrome at the elbow  This is related to compression of the ulnar nerve at or around the medial epicondyle or FCU fascia  Compression typically results in numbness to the hand, pain, and occasionally weakness  We discussed that there are nonoperative and operative modalities for treatment and at the majority of patients benefit from non operative modalities  Typically, this involves the night splinting and ergonomic adjustments to prevent direct pressure or compression of the ulnar nerve at the elbow  Surgical treatment can involve in situ decompression or transposition if the nerve is unstable  Recommended nighttime towel splinting or purchasing a splint online or at a local grocery store if he is not able to use a towel      Follow Up: 6 weeks    To Do Next Visit: re-examination     PROCEDURES PERFORMED:  Procedures  No Procedures performed today

## 2020-08-03 NOTE — PROGRESS NOTES
Chief Complaint     ***      History of Present Illness     Sammi Diggs is a 38 y o  male ***          Past Medical History:   Diagnosis Date    GERD (gastroesophageal reflux disease)     Hypertension     Lateral epicondylitis        Past Surgical History:   Procedure Laterality Date    APPENDECTOMY      LATERAL EPICONDYLE RELEASE Right 6/27/2017    Procedure: ELBOW LATERAL EPICONDYLAR RELEASE;  Surgeon: Donita Blank MD;  [facility];  Service: Orthopedics       No Known Allergies    Current Outpatient Medications on File Prior to Visit   Medication Sig Dispense Refill    hydrochlorothiazide (HYDRODIURIL) 25 mg tablet Take 1 tablet (25 mg total) by mouth daily 90 tablet 0    lisinopril (ZESTRIL) 10 mg tablet Take 1 tablet (10 mg total) by mouth daily 90 tablet 0    meloxicam (MOBIC) 7 5 mg tablet Take 1 tablet (7 5 mg total) by mouth daily as needed for moderate pain 30 tablet 0     No current facility-administered medications on file prior to visit  Social History     Tobacco Use    Smoking status: Never Smoker    Smokeless tobacco: Never Used   Substance Use Topics    Alcohol use: Yes    Drug use: No       History reviewed  No pertinent family history  Review of Systems     As stated in the HPI  All other systems were reviewed and are negative  Physical Exam     /76   Pulse 97   Temp 99 1 °F (37 3 °C)   Ht 5' 10"   Wt 96 2 kg (212 lb)   BMI 30 42 kg/m²     GENERAL: This is a well-developed, well-nourished, age-appropriate patient in no acute distress  The patient is alert and oriented x3  Pleasant and cooperative  Eyes: Anicteric sclerae  Extraocular movements appear intact  HENT: Nares are patent with no drainage  Lungs: There is equal chest rise on inspection  Breathing is non-labored with no audible wheezing  Cardiovascular: No cyanosis  No upper extremity lymphadema  Skin: Skin is warm to touch   No obvious skin lesions or rashes other than described below  Neurologic: No ataxia  Psychiatric: Mood and affect are appropriate      ***    Data Review     Results Reviewed     None             Imaging:  ***    Assessment and Plan      Diagnoses and all orders for this visit:    Right arm pain  -     Ambulatory referral to Orthopedic Surgery             ***      Follow Up: ***    To Do Next Visit: ***    PROCEDURES PERFORMED:  Procedures  {Was [facility] done:[id number]::"No Procedures performed today"}

## 2020-08-03 NOTE — LETTER
August 3, 2020     Sharmila Mirtha   [address]  [address] [place] [address]    Patient: Raul Alan   YOB: 1985   Date of Visit: 8/3/2020       Dear Dr Yosef Urbano: Thank you for referring Raul Alan to me for evaluation  Below are my notes for this consultation  If you have questions, please do not hesitate to call me  I look forward to following your patient along with you  Sincerely,        Jocelyne Plummer MD        CC: No Recipients  Jocelyne Plummer MD  8/3/2020  5:29 PM  Incomplete  Chief Complaint     Right hand numbness      History of Present Illness     Raul Alan is a 38 y o  male right-hand dominant, presents with right hand numbness and elbow discomfort  I am being asked to see him in consultation at the request of Sharmila Shannon  Patient has a history of right lateral epicondyle release performed by Dr Adama Florence on 06/27/2017 as part of a worker's compensation claim  Patient's postoperative course progressed uneventfully and the majority of his lateral epicondylar symptoms have resolved  During his last evaluation by Dr Adama Florence in 2017 there were concerns for right cubital tunnel syndrome but the patient did not pursue further workup as it did not follow under his worker's compensation claim  Over the next 3 years, he noted continued worsening of the elbow discomfort with concomitant numbness in his right small ring and long fingers  He also feels a painful clicking sensation on the medial aspect of the elbow with flexion which is associated with right hand numbness  Pain is worse with activity  He is a  and feels that the numbness is interfering with his work  He denies any previous physical therapy or splinting  Denies neck pain or karl weakness of the right upper extremity  No other complaints            Past Medical History:   Diagnosis Date    GERD (gastroesophageal reflux disease)     Hypertension  Lateral epicondylitis        Past Surgical History:   Procedure Laterality Date    APPENDECTOMY      LATERAL EPICONDYLE RELEASE Right 6/27/2017    Procedure: ELBOW LATERAL EPICONDYLAR RELEASE;  Surgeon: Mimi Blakely MD;  [facility];  Service: Orthopedics       No Known Allergies    Current Outpatient Medications on File Prior to Visit   Medication Sig Dispense Refill    hydrochlorothiazide (HYDRODIURIL) 25 mg tablet Take 1 tablet (25 mg total) by mouth daily 90 tablet 0    lisinopril (ZESTRIL) 10 mg tablet Take 1 tablet (10 mg total) by mouth daily 90 tablet 0    meloxicam (MOBIC) 7 5 mg tablet Take 1 tablet (7 5 mg total) by mouth daily as needed for moderate pain 30 tablet 0     No current facility-administered medications on file prior to visit  Social History     Tobacco Use    Smoking status: Never Smoker    Smokeless tobacco: Never Used   Substance Use Topics    Alcohol use: Yes    Drug use: No       History reviewed  No pertinent family history  Review of Systems     As stated in the HPI  All other systems were reviewed and are negative  Physical Exam     /76   Pulse 97   Temp 99 1 °F (37 3 °C)   Ht 5' 10"   Wt 96 2 kg (212 lb)   BMI 30 42 kg/m²     GENERAL: This is a well-developed, well-nourished, age-appropriate patient in no acute distress  The patient is alert and oriented x3  Pleasant and cooperative  Eyes: Anicteric sclerae  Extraocular movements appear intact  HENT: Nares are patent with no drainage  Lungs: There is equal chest rise on inspection  Breathing is non-labored with no audible wheezing  Cardiovascular: No cyanosis  No upper extremity lymphadema  Skin: Skin is warm to touch  No obvious skin lesions or rashes other than described below  Neurologic: No ataxia  Psychiatric: Mood and affect are appropriate      Right upper extremity:  · Well-healed surgical incision lateral condyle  · Elbow extension limited to about 10° short of neutral compared to the contralateral which hyperextends  Flexion limited to about 10° less than contralateral flexion  Pro supination is slightly limited as well  · Negative Lhermitte and Spurling's    · No wasting of 1st dorsal webspace appreciated  · Negative Wartenberg sign  · Positive Tinel's sign over the cubital tunnel and Guyon's canal  · Negative Tinel's sign over carpal tunnel  · Positive Durkan's compression test  · Subluxation of ulnar nerve over medial epicondyle with elbow flexion   · 4/5 FDP 5  · 5/5 Motor to the APB, FDI, FDP2, EDC  Sensation intact to light touch in the median, radial, and decreased in the ulnar nerve distribution  · Palpable radial and ulnar pulses     Data Review     Results Reviewed     None             Imaging:  None today    Assessment and Plan      Diagnoses and all orders for this visit:    Right arm pain  -     Ambulatory referral to Orthopedic Surgery           59-year-old male with right cubital tunnel syndrome and an unstable ulnar nerve  We discussed the etiology and natural course of cubital tunnel syndrome at the elbow  This is related to compression of the ulnar nerve at or around the medial epicondyle or FCU fascia  Compression typically results in numbness to the hand, pain, and occasionally weakness  We discussed that there are nonoperative and operative modalities for treatment and at the majority of patients benefit from non operative modalities  Typically, this involves the night splinting and ergonomic adjustments to prevent direct pressure or compression of the ulnar nerve at the elbow  Surgical treatment can involve in situ decompression or transposition if the nerve is unstable  Recommended nighttime towel splinting or purchasing a splint online or at a local grocery store if he is not able to use a towel      Follow Up: 6 weeks    To Do Next Visit: re-examination     PROCEDURES PERFORMED:  Procedures  No Procedures performed today    Caity Denton Maricarmen Tyler MD  8/3/2020  5:28 PM  Sign when Signing Visit  Chief Complaint     Right hand numbness      History of Present Illness     Rand Price is a 38 y o  male right-hand dominant, presents with right hand numbness and elbow discomfort  Patient has a history of right lateral epicondyle release performed by Dr Al Laughlin on 06/27/2017 as part of a worker's compensation claim  Patient's postoperative course progressed uneventfully and the majority of his lateral epicondylar symptoms have resolved  During his last evaluation by Dr Al Laughlin in 2017 there were concerns for right cubital tunnel syndrome but the patient did not pursue further workup as it did not follow under his worker's compensation claim  Over the next 3 years, he noted continued worsening of the elbow discomfort with concomitant numbness in his right small ring and long fingers  He also feels a painful clicking sensation on the medial aspect of the elbow with flexion which is associated with right hand numbness  Pain is worse with activity  He is a  and feels that the numbness is interfering with his work  He denies any previous physical therapy or splinting  Denies neck pain or karl weakness of the right upper extremity  No other complaints            Past Medical History:   Diagnosis Date    GERD (gastroesophageal reflux disease)     Hypertension     Lateral epicondylitis        Past Surgical History:   Procedure Laterality Date    APPENDECTOMY      LATERAL EPICONDYLE RELEASE Right 6/27/2017    Procedure: ELBOW LATERAL EPICONDYLAR RELEASE;  Surgeon: Luis Bartlett MD;  [facility];  Service: Orthopedics       No Known Allergies    Current Outpatient Medications on File Prior to Visit   Medication Sig Dispense Refill    hydrochlorothiazide (HYDRODIURIL) 25 mg tablet Take 1 tablet (25 mg total) by mouth daily 90 tablet 0    lisinopril (ZESTRIL) 10 mg tablet Take 1 tablet (10 mg total) by mouth daily 90 tablet 0    meloxicam (MOBIC) 7 5 mg tablet Take 1 tablet (7 5 mg total) by mouth daily as needed for moderate pain 30 tablet 0     No current facility-administered medications on file prior to visit  Social History     Tobacco Use    Smoking status: Never Smoker    Smokeless tobacco: Never Used   Substance Use Topics    Alcohol use: Yes    Drug use: No       History reviewed  No pertinent family history  Review of Systems     As stated in the HPI  All other systems were reviewed and are negative  Physical Exam     /76   Pulse 97   Temp 99 1 °F (37 3 °C)   Ht 5' 10"   Wt 96 2 kg (212 lb)   BMI 30 42 kg/m²     GENERAL: This is a well-developed, well-nourished, age-appropriate patient in no acute distress  The patient is alert and oriented x3  Pleasant and cooperative  Eyes: Anicteric sclerae  Extraocular movements appear intact  HENT: Nares are patent with no drainage  Lungs: There is equal chest rise on inspection  Breathing is non-labored with no audible wheezing  Cardiovascular: No cyanosis  No upper extremity lymphadema  Skin: Skin is warm to touch  No obvious skin lesions or rashes other than described below  Neurologic: No ataxia  Psychiatric: Mood and affect are appropriate  Right upper extremity:  · Well-healed surgical incision lateral condyle  · Elbow extension limited to about 10° short of neutral compared to the contralateral which hyperextends  Flexion limited to about 10° less than contralateral flexion  Pro supination is slightly limited as well  · Negative Lhermitte and Spurling's    · No wasting of 1st dorsal webspace appreciated  · Negative Wartenberg sign  · Positive Tinel's sign over the cubital tunnel and Guyon's canal  · Negative Tinel's sign over carpal tunnel  · Positive Durkan's compression test  · Subluxation of ulnar nerve over medial epicondyle with elbow flexion   · 4/5 FDP 5  · 5/5 Motor to the APB, FDI, FDP2, EDC   Sensation intact to light touch in the median, radial, and decreased in the ulnar nerve distribution  · Palpable radial and ulnar pulses     Data Review     Results Reviewed     None             Imaging:  None today    Assessment and Plan      Diagnoses and all orders for this visit:    Right arm pain  -     Ambulatory referral to Orthopedic Surgery           42-year-old male with right cubital tunnel syndrome and an unstable ulnar nerve  We discussed the etiology and natural course of cubital tunnel syndrome at the elbow  This is related to compression of the ulnar nerve at or around the medial epicondyle or FCU fascia  Compression typically results in numbness to the hand, pain, and occasionally weakness  We discussed that there are nonoperative and operative modalities for treatment and at the majority of patients benefit from non operative modalities  Typically, this involves the night splinting and ergonomic adjustments to prevent direct pressure or compression of the ulnar nerve at the elbow  Surgical treatment can involve in situ decompression or transposition if the nerve is unstable  Recommended nighttime towel splinting or purchasing a splint online or at a local grocery store if he is not able to use a towel      Follow Up: 6 weeks    To Do Next Visit: re-examination     PROCEDURES PERFORMED:  Procedures  No Procedures performed today

## 2020-08-05 ENCOUNTER — APPOINTMENT (OUTPATIENT)
Dept: PHYSICAL THERAPY | Facility: CLINIC | Age: 35
End: 2020-08-05
Payer: COMMERCIAL

## 2020-08-07 ENCOUNTER — APPOINTMENT (OUTPATIENT)
Dept: PHYSICAL THERAPY | Facility: CLINIC | Age: 35
End: 2020-08-07
Payer: COMMERCIAL

## 2020-08-07 NOTE — PROGRESS NOTES
Daily Note     Today's date: 2020  Patient name: Jean Pierre Azevedo  : 1985  MRN: [id number]  Referring provider: Brandon Knott DO  Dx:   Encounter Diagnosis     ICD-10-CM    1  Chronic pain of both knees  M25 561     M25 562     G89 29                   Subjective: Pt reports ***      Objective: See treatment diary below      Assessment: Tolerated treatment well  Patient would benefit from continued PT      Plan: Continue per plan of care  Precautions: PMH: HTN    Manuals          STM/IASTM to Distal ITB B/L  KD  STM   4' EH          ITB Stretch B/L  KD  3x30" EH          HS Stretch B/L  KD  3x30" EH                                                              Neuro Re-Ed             Step ups             Step downs with ecc  focus             TB TKE  BTB  30x ea   BTB  3"x30                                                              Ther Ex             Bike  nv 7 min          Mini squats HEP nv NV          SLR flex HEP 3x10 3x10          SLR abd HEP 2x10 NV          Clamshells  OTB  2x10 OTB  2x12          Bridges HEP OTB  2x12 OTB  2x12          SLR add  np           SLR ext  np           Leg press Ecc  85#  2x10 85#  2x10                                                              Ther Activity                                       Gait Training                                       Modalities

## 2020-08-12 ENCOUNTER — APPOINTMENT (OUTPATIENT)
Dept: PHYSICAL THERAPY | Facility: CLINIC | Age: 35
End: 2020-08-12
Payer: COMMERCIAL

## 2020-08-14 ENCOUNTER — OFFICE VISIT (OUTPATIENT)
Dept: PHYSICAL THERAPY | Facility: CLINIC | Age: 35
End: 2020-08-14
Payer: COMMERCIAL

## 2020-08-14 DIAGNOSIS — G89.29 CHRONIC PAIN OF BOTH KNEES: Primary | ICD-10-CM

## 2020-08-14 DIAGNOSIS — M25.561 CHRONIC PAIN OF BOTH KNEES: Primary | ICD-10-CM

## 2020-08-14 DIAGNOSIS — M25.562 CHRONIC PAIN OF BOTH KNEES: Primary | ICD-10-CM

## 2020-08-14 PROCEDURE — 97110 THERAPEUTIC EXERCISES: CPT

## 2020-08-14 PROCEDURE — 97140 MANUAL THERAPY 1/> REGIONS: CPT

## 2020-08-14 PROCEDURE — 97112 NEUROMUSCULAR REEDUCATION: CPT

## 2020-08-14 NOTE — PROGRESS NOTES
Daily Note     Today's date: 2020  Patient name: Beth Dee  : 1985  MRN: [id number]  Referring provider: Stefanie Laguna DO  Dx:   Encounter Diagnosis     ICD-10-CM    1  Chronic pain of both knees  M25 561     M25 562     G89 29                 Subjective: Patient admits very minimal to no changes in bilateral knee symptoms  Still having "popping" in both knees  Objective: See treatment diary below  Assessment: Added "the stick" to bilateral ITB to address soft tissue restrictions leading to tightness  Also still with tight hamstrings  Fatigues with both hip and knee strengthening indicating continued weakness contributing to increased stress on bilateral knee joints  Good eccentric control  Progress as able and ensure compensations not used  Plan: Continue per plan of care  Precautions: PMH: HTN    Manuals          STM/IASTM to Distal ITB B/L  KD  STM   4' [facility] - the stick         ITB Stretch B/L  KD  3x30" [address] - the stick         HS Stretch B/L  KD  3x30" [address]                                                             Neuro Re-Ed             Step ups             Step downs with ecc  focus             TB TKE  BTB  30x ea   BTB  3"x30 BTB  3"x30                                                             Ther Ex             Bike  nv 7 min 7 min         Mini squats HEP nv NV NV         SLR flex HEP 3x10 3x10 3x10         SLR abd HEP 2x10 NV NV         Clamshells  OTB  2x10 OTB  2x12 OTB  2x12         Bridges HEP OTB  2x12 OTB  2x12 OTB  2x12         SLR add  np           SLR ext  np           Leg press   Ecc - seat 7  85#  2x10 85#  2x10 95#  2x10                                                             Ther Activity                                       Gait Training                                       Modalities

## 2020-08-18 ENCOUNTER — TELEPHONE (OUTPATIENT)
Dept: OBGYN CLINIC | Facility: MEDICAL CENTER | Age: 35
End: 2020-08-18

## 2020-08-18 NOTE — TELEPHONE ENCOUNTER
Patient has appt on 9/14 with Dr Memo Gonzalez will be out of the office  I called patient to reschedule appt and no answer  I LVM to give us a call back to reschedule

## 2020-08-19 ENCOUNTER — EVALUATION (OUTPATIENT)
Dept: PHYSICAL THERAPY | Facility: CLINIC | Age: 35
End: 2020-08-19
Payer: COMMERCIAL

## 2020-08-19 DIAGNOSIS — M25.561 CHRONIC PAIN OF BOTH KNEES: Primary | ICD-10-CM

## 2020-08-19 DIAGNOSIS — M25.562 CHRONIC PAIN OF BOTH KNEES: Primary | ICD-10-CM

## 2020-08-19 DIAGNOSIS — G89.29 CHRONIC PAIN OF BOTH KNEES: Primary | ICD-10-CM

## 2020-08-19 PROCEDURE — 97110 THERAPEUTIC EXERCISES: CPT

## 2020-08-19 PROCEDURE — 97140 MANUAL THERAPY 1/> REGIONS: CPT

## 2020-08-19 NOTE — PROGRESS NOTES
Daily Note     Today's date: 2020  Patient name: Abbie Nyhan  : 1985  MRN: [id number]  Referring provider: Génesis Kaplan DO  Dx:   Encounter Diagnosis     ICD-10-CM    1  Chronic pain of both knees  M25 561     M25 562     G89 29                 Subjective: Patient admits a little less pain in B/L knees  Objective: See treatment diary below  Assessment: Still with soft tissue restrictions of ITB and tightness of ITB and HS B/L  Was able to tolerate increased sets and reps today  However, still with weakness of hip and knee which affects continued stress on knees  Patient would benefit from continued PT fo ran optimal return to function  Plan: Continue per plan of care  Precautions: PMH: HTN    Manuals         STM/IASTM to Distal ITB B/L  KD  STM   4' [address] EH - the stick EH - the stick        ITB Stretch B/L  KD  3x30" [address] [facility] [address] EH - the stick KD  3x30"        HS Stretch B/L  KD  3x30" [address] [facility] [address] EH KD  3x30"                                                            Neuro Re-Ed             Step ups             Step downs with ecc  focus             TB TKE  BTB  30x ea   BTB  3"x30 BTB  3"x30 nv                                                            Ther Ex             Bike  nv 7 min 7 min 5 min        Mini squats HEP nv NV NV         SLR flex HEP 3x10 3x10 3x10 1#  3x12        SLR abd HEP 2x10 NV NV         Clamshells  OTB  2x10 OTB  2x12 OTB  2x12 PiTB  2x12        Bridges HEP OTB  2x12 OTB  2x12 OTB  2x12 PiTB  2x15        Heel slides w/ bridge on disk     2x10        SLR add  np           SLR ext  np           Side Step     NV        Leg press   Ecc - seat 7  85#  2x10 85#  2x10 95#  2x10 95#  3x15 Inc # NV                                                           Ther Activity                                       Gait Training                                       Modalities

## 2020-08-21 ENCOUNTER — APPOINTMENT (OUTPATIENT)
Dept: PHYSICAL THERAPY | Facility: CLINIC | Age: 35
End: 2020-08-21
Payer: COMMERCIAL

## 2020-08-26 ENCOUNTER — APPOINTMENT (OUTPATIENT)
Dept: PHYSICAL THERAPY | Facility: CLINIC | Age: 35
End: 2020-08-26
Payer: COMMERCIAL

## 2020-08-28 ENCOUNTER — OFFICE VISIT (OUTPATIENT)
Dept: PHYSICAL THERAPY | Facility: CLINIC | Age: 35
End: 2020-08-28
Payer: COMMERCIAL

## 2020-08-28 DIAGNOSIS — G89.29 CHRONIC PAIN OF BOTH KNEES: Primary | ICD-10-CM

## 2020-08-28 DIAGNOSIS — M25.562 CHRONIC PAIN OF BOTH KNEES: Primary | ICD-10-CM

## 2020-08-28 DIAGNOSIS — M25.561 CHRONIC PAIN OF BOTH KNEES: Primary | ICD-10-CM

## 2020-08-28 PROCEDURE — 97140 MANUAL THERAPY 1/> REGIONS: CPT

## 2020-08-28 PROCEDURE — 97110 THERAPEUTIC EXERCISES: CPT

## 2020-08-28 NOTE — PROGRESS NOTES
Daily Note     Today's date: 2020  Patient name: Poncho Benavidez  : 1985  MRN: [id number]  Referring provider: Ramila Ruano DO  Dx:   Encounter Diagnosis     ICD-10-CM    1  Chronic pain of both knees  M25 561     M25 562     G89 29                 Subjective: Patient admits his knees are improving slightly  Still has pain with certain activities and throughout the day  Objective: See treatment diary below  Assessment: Educated on self stretching to address unresolved hamstring and ITB tightness  Minimal cueing needed for proper form with exercises  Still demonstrating hip and knee weakness which is being addressed with current program  Will continue to benefit from manual and TE to address unresolved deficits that are contributing to ongoing stress on knee joints  Plan: Continue per plan of care  Precautions: PMH: HTN    Manuals        STM/IASTM to Distal ITB B/L  KD  STM   4' 1404 [facility] EH - the stick EH - the stick EH - the stick       ITB Stretch B/L  KD  3x30" 1404 [facility] EH - the stick KD  3x30" Self with strap 30"x3       HS Stretch B/L  KD  3x30" 1404 [facility] EH KD  3x30" Self with strap  30"x3                                                           Neuro Re-Ed             Step ups             Step downs with ecc  focus             TB TKE  BTB  30x ea   BTB  3"x30 BTB  3"x30 nv NV                                                           Ther Ex             Bike  nv 7 min 7 min 5 min 5 min       Mini squats HEP nv NV NV         SLR flex HEP 3x10 3x10 3x10 1#  3x12 1#  3x12       SLR abd HEP 2x10 NV NV         Clamshells  OTB  2x10 OTB  2x12 OTB  2x12 PiTB  2x12 Pink  2x12       Bridges HEP OTB  2x12 OTB  2x12 OTB  2x12 PiTB  2x15 Pink  2x15       Heel slides w/ bridge on disk     2x10 2x10       SLR add  np           SLR ext  np           Side Step     NV        Leg press   Ecc - seat 7  85#  2x10 85#  2x10 95#  2x10 95#  3x15 105#  3x10 Ther Activity                                       Gait Training                                       Modalities

## 2020-09-16 ENCOUNTER — OFFICE VISIT (OUTPATIENT)
Dept: OBGYN CLINIC | Facility: MEDICAL CENTER | Age: 35
End: 2020-09-16
Payer: COMMERCIAL

## 2020-09-16 VITALS
HEART RATE: 96 BPM | TEMPERATURE: 98.7 F | SYSTOLIC BLOOD PRESSURE: 129 MMHG | WEIGHT: 212 LBS | BODY MASS INDEX: 30.35 KG/M2 | DIASTOLIC BLOOD PRESSURE: 74 MMHG | HEIGHT: 70 IN

## 2020-09-16 DIAGNOSIS — M77.11 LATERAL EPICONDYLITIS OF RIGHT ELBOW: ICD-10-CM

## 2020-09-16 DIAGNOSIS — G56.01 CARPAL TUNNEL SYNDROME ON RIGHT: ICD-10-CM

## 2020-09-16 DIAGNOSIS — G56.21 GUYON SYNDROME, RIGHT: ICD-10-CM

## 2020-09-16 DIAGNOSIS — G56.21 CUBITAL TUNNEL SYNDROME ON RIGHT: Primary | ICD-10-CM

## 2020-09-16 PROCEDURE — 99214 OFFICE O/P EST MOD 30 MIN: CPT | Performed by: ORTHOPAEDIC SURGERY

## 2020-09-16 NOTE — LETTER
September 16, 2020     Patient: Juanito Aggarwal   YOB: 1985   Date of Visit: 9/16/2020       To Whom it May Concern:    Juanito Aggarwal is under my professional care  He was seen in my office on 9/16/2020  He should be allowed to work with his wrist brace during all duty as it will help decrease his elbow pain  If you have any questions or concerns, please don't hesitate to call           Sincerely,          Morgan Ruiz MD        CC: Rossanafran Jasen

## 2020-09-16 NOTE — PROGRESS NOTES
Chief Complaint     Right elbow pain with hand Numbness and Tingling      History of Present Illness     Melonie Henson is a 38 y o  male who is seen in follow up for right cubital tunnel syndrome  He notes that he is using the towel splints off and on but his symptoms remain the same  He continues pain in the medial elbow with ulnar sided hand numbness and tingling with no new injury since the last visit  Not dropping objects  He also now complains of lateral elbow pain around area of previous surgery  This was present at last visit even though he did not mention it much but it has worsened since then  No treatment for this thus far during this episode  Pain is worse with movement and activity and better with rest     Patient also notes that he had a nerve study performed a while ago which noted that he had carpal tunnel syndrome  Past Medical History:   Diagnosis Date    GERD (gastroesophageal reflux disease)     Hypertension     Lateral epicondylitis        Past Surgical History:   Procedure Laterality Date    APPENDECTOMY      LATERAL EPICONDYLE RELEASE Right 6/27/2017    Procedure: ELBOW LATERAL EPICONDYLAR RELEASE;  Surgeon: Alicja Engel MD;  [facility];  Service: Orthopedics       No Known Allergies    Current Outpatient Medications on File Prior to Visit   Medication Sig Dispense Refill    hydrochlorothiazide (HYDRODIURIL) 25 mg tablet Take 1 tablet (25 mg total) by mouth daily 90 tablet 0    lisinopril (ZESTRIL) 10 mg tablet Take 1 tablet (10 mg total) by mouth daily 90 tablet 0    meloxicam (MOBIC) 7 5 mg tablet Take 1 tablet (7 5 mg total) by mouth daily as needed for moderate pain 30 tablet 0     No current facility-administered medications on file prior to visit  Social History     Tobacco Use    Smoking status: Never Smoker    Smokeless tobacco: Never Used   Substance Use Topics    Alcohol use: Yes    Drug use: No       History reviewed   No pertinent family history  Review of Systems     As stated in the HPI  All other systems were reviewed and are negative  Physical Exam     /74   Pulse 96   Temp 98 7 °F (37 1 °C)   Ht 5' 10" (1 778 m)   Wt 96 2 kg (212 lb)   BMI 30 42 kg/m²     GENERAL: This is a well-developed, well-nourished, age-appropriate patient in no acute distress  The patient is alert and oriented x3  Pleasant and cooperative  Eyes: Anicteric sclerae  Extraocular movements appear intact  HENT: Nares are patent with no drainage  Lungs: There is equal chest rise on inspection  Breathing is non-labored with no audible wheezing  Cardiovascular: No cyanosis  No upper extremity lymphadema  Skin: Skin is warm to touch  No obvious skin lesions or rashes other than described below  Neurologic: No ataxia  Psychiatric: Mood and affect are appropriate  Right Upper Extremity:  TTP over medial and lateral epicondyle  Subluxing ulnar nerve upon examination of elbow with flexion-extension  Pain over lateral epicondyle with resisted wrist extension  Positive Tinel's over cubital tunnel and flexion-compression test  Positive Tinel's and compression test over Guyon's canal  5/5 Motor to the APB, FDI, FDP2, EDC  4+/5 to FDP5  Decreased sensation over small finger but normal sensation over dorsal palm  Fingers WWP      Data Review     Results Reviewed     None             Imaging:  No new imaging    Assessment and Plan      Diagnoses and all orders for this visit:    Cubital tunnel syndrome on right    Lateral epicondylitis of right elbow    Guyon syndrome, right    Carpal tunnel syndrome on right         71-year-old male with cubital tunnel syndrome with subluxating ulnar nerve, Guyon canal compression as well as carpal tunnel syndrome likely  He also has persistence or recurrence of lateral elbow pain associated with lateral epicondylitis    Patient has tried nonoperative modalities for his cubital tunnel syndrome with no significant improvement  Recommended surgical intervention for this  Also discussed that Guyon canal compression can continue to contribute to numbness and tingling and weakness in the hand especially associated with ulnar nerve issues so I recommended that this would be included in the surgical procedure  Prior to any surgical procedure we would get a nerve conduction study for objective measurements of his condition  Patient is not interested in surgical intervention at this time especially given how busy he is at work  Recommended keeping a close eye on his symptoms and discussing this with me in the future again  We will get the patient a wrist brace to help decrease pain in lateral elbow  We also discussed the role of stretching  We discussed the etiology and natural course of cubital tunnel syndrome at the elbow  This is related to compression of the ulnar nerve at or around the medial epicondyle or FCU fascia  Compression typically results in numbness to the hand, pain, and occasionally weakness  We discussed that there are nonoperative and operative modalities for treatment and at the majority of patients benefit from non operative modalities  Typically, this involves the night splinting and ergonomic adjustments to prevent direct pressure or compression of the ulnar nerve at the elbow  Surgical treatment can involve in situ decompression or transposition if the nerve is unstable  We discussed the etiology and natural course of carpal tunnel syndrome  We discussed that carpal tunnel syndrome is related to increased pressure on the nerve in the carpal canal at the level of the wrist   Increasing pressure can be a result of wrist flexion or extension or changes to the contents of the carpal tunnel  We discussed that progression of this condition from mild to severe can result in numbness and tingling as well as dysfunction of the hand and even atrophy and weakness to the thumb musculature    Treatment options for this condition range from nonoperative to operative and the mainstays are nighttime splinting for nonoperative measures and carpal tunnel release for operative measures  Trial of nonoperative measures for around 6 weeks is typically beneficial prior to any surgical intervention and can help to avoid surgery  Surgical release is performed with a mini open approach and while it can be performed with local only or local and sedation, there are risks to the procedure that include bleeding, infection, damage to surrounding neurovascular structures, weakness, pillar pain, and persistence of symptoms  I also discussed with the patient that if carpal tunnel persists in both wrists that surgical intervention can be performed simultaneously and that recovery is expedited  We discussed the natural course of this disease and how it is likely to get better on its own over time, though this may take up to 1-2 years  I have discussed that there are other management opportunities including therapy and wrist bracing that may expedite this process  Wrist bracing during activities, education on  ergonomics, specifically with an avoidance of palm down gripping, and hand therapy for stretching more than strengthening of the forearm musculature will be beneficial   Typically, there is improvement with these modalities in the 1st 6 weeks after initiation of treatment  We have discussed that the role of corticosteroid injection has changed with recent research showing that it may exacerbate symptoms in the long term, and so I only reserve injections for those who are refractory to any treatment for about a 3 month period or more  Surgical intervention utilizing open or arthroscopic procedures is reserved for the select few who do not get any better over a long period of time  Follow Up: PRN if he decides he is interested in surgical intervention   We would look to complete a cubital tunnel release, carpal tunnel release, and Guyon's canal release    PROCEDURES PERFORMED:  No Procedures performed today

## 2020-09-17 ENCOUNTER — EVALUATION (OUTPATIENT)
Dept: PHYSICAL THERAPY | Facility: CLINIC | Age: 35
End: 2020-09-17
Payer: COMMERCIAL

## 2020-09-17 DIAGNOSIS — M25.562 CHRONIC PAIN OF BOTH KNEES: Primary | ICD-10-CM

## 2020-09-17 DIAGNOSIS — G89.29 CHRONIC PAIN OF BOTH KNEES: Primary | ICD-10-CM

## 2020-09-17 DIAGNOSIS — M25.561 CHRONIC PAIN OF BOTH KNEES: Primary | ICD-10-CM

## 2020-09-17 PROCEDURE — 97110 THERAPEUTIC EXERCISES: CPT

## 2020-09-17 NOTE — PROGRESS NOTES
Progress Note     Today's date: 2020  Patient name: Jean Pierre Azevedo  : 1985  MRN: [id number]  Referring provider: Brandon Knott DO  Dx:   Encounter Diagnosis     ICD-10-CM    1  Chronic pain of both knees  M25 561     M25 562     G89 29                 Assessment:  Jean Pierre Azevedo is a 37 yo male who has been adherent to participating in 7 skilled PT visits and with his HEP  [facility]  has made improvements in objective data and achieved desired functional goals  Patient reports having returned to their prior level or function  It was mutually agreed to Discharge to home exercise program at this time  Patient has good understanding of provided home exercise program and was instructed to call with any questions or if issues should arise  Goals  Short term goals - to be achieved in 4 weeks:     Decrease pain 20-50%  - MET   Increase strength by 1/2 grade  - PARTIALLY MET  Long term goals - to be achieved by discharge:    Ambulation is improved to maximal level of function  - MET   Squatting is improved to maximal level of function  - MET   Stair climbing is improved to maximal level of function  - MET   IADL performance in related activities is improved to maximal level of function   - MET   Performance in related work activities is improved to maximal level of function  - MET      Subjective: Patient feels his knees have been feeling better  He has had little pain at work  He has been being in an extended squat position at work  Patient reports pain at worst 1/10 in last week  Objective: See treatment diary below      Active Range of Motion   Left Knee   Flexion: 130 degrees   Extension: 0 degrees     Right Knee   Flexion: 130 degrees   Extension: 0 degrees     Passive Range of Motion   Left Knee   Flexion: 135 degrees   Extension: 0 degrees     Right Knee   Flexion: 135 degrees   Extension: 0 degrees     Strength/Myotome Testing     Left Hip   Planes of Motion Flexion: 4-  Extension: 4-   Abduction: 4-   Adduction: 4-     Right Hip   Planes of Motion   Flexion: 4-   Extension: 4-  Abduction: 4-   Adduction: 4-     Left Knee   Flexion: 4-  Extension: 4+  Quadriceps contraction: good    Right Knee   Flexion: 4-  Extension: 4+  Quadriceps contraction: good    General Comments:      Knee Comments  Functional squat: no pain  SLS on bilateral LE's       Plan: Patient is being discharged to [facility] at this time  Precautions: PMH: HTN    Manuals 7/21 7/28 7/31 8/14 8/19 8/28 9/17      STM/IASTM to Distal ITB B/L  KD  STM   4' [facility] EH - the stick EH - the stick EH - the stick       ITB Stretch B/L  KD  3x30" [facility] EH - the stick KD  3x30" Self with strap 30"x3 Self with strap 30"x3      HS Stretch B/L  KD  3x30" EH EH KD  3x30" Self with strap  30"x3 Self with strap 30"x3                                                          Neuro Re-Ed             Step ups             Step downs with ecc  focus             TB TKE  BTB  30x ea   BTB  3"x30 BTB  3"x30 nv NV BTB  3"x30                                                          Ther Ex             Bike  nv 7 min 7 min 5 min 5 min 5 min      Mini squats HEP nv NV NV         SLR flex HEP 3x10 3x10 3x10 1#  3x12 1#  3x12 np      SLR abd HEP 2x10 NV NV         Clamshells  OTB  2x10 OTB  2x12 OTB  2x12 PiTB  2x12 Pink  2x12 PiTB  2x12      Bridges HEP OTB  2x12 OTB  2x12 OTB  2x12 PiTB  2x15 Pink  2x15 PiTB  2x15      Heel slides w/ bridge on disk     2x10 2x10 np      SLR add  np           SLR ext  np           Side Step     NV        Leg press   Ecc - seat 7  85#  2x10 85#  2x10 95#  2x10 95#  3x15 105#  3x10 np                                                          Ther Activity                                       Gait Training                                       Modalities

## 2020-09-24 ENCOUNTER — APPOINTMENT (OUTPATIENT)
Dept: PHYSICAL THERAPY | Facility: CLINIC | Age: 35
End: 2020-09-24
Payer: COMMERCIAL

## 2020-10-27 DIAGNOSIS — I10 ESSENTIAL HYPERTENSION: ICD-10-CM

## 2020-10-27 DIAGNOSIS — I10 HYPERTENSION, UNSPECIFIED TYPE: ICD-10-CM

## 2020-10-27 RX ORDER — HYDROCHLOROTHIAZIDE 25 MG/1
25 TABLET ORAL DAILY
Qty: 90 TABLET | Refills: 0 | Status: SHIPPED | OUTPATIENT
Start: 2020-10-27 | End: 2021-02-09

## 2020-10-27 RX ORDER — LISINOPRIL 10 MG/1
10 TABLET ORAL DAILY
Qty: 90 TABLET | Refills: 0 | Status: SHIPPED | OUTPATIENT
Start: 2020-10-27 | End: 2021-02-09

## 2020-11-12 ENCOUNTER — TELEMEDICINE (OUTPATIENT)
Dept: FAMILY MEDICINE CLINIC | Facility: CLINIC | Age: 35
End: 2020-11-12
Payer: COMMERCIAL

## 2020-11-12 DIAGNOSIS — J06.9 VIRAL URI: ICD-10-CM

## 2020-11-12 DIAGNOSIS — Z20.822 SUSPECTED COVID-19 VIRUS INFECTION: ICD-10-CM

## 2020-11-12 DIAGNOSIS — Z20.822 EXPOSURE TO COVID-19 VIRUS: Primary | ICD-10-CM

## 2020-11-12 PROCEDURE — 99214 OFFICE O/P EST MOD 30 MIN: CPT | Performed by: FAMILY MEDICINE

## 2020-11-13 DIAGNOSIS — Z20.822 EXPOSURE TO COVID-19 VIRUS: ICD-10-CM

## 2020-11-13 DIAGNOSIS — Z20.822 SUSPECTED COVID-19 VIRUS INFECTION: ICD-10-CM

## 2020-11-13 DIAGNOSIS — J06.9 VIRAL URI: ICD-10-CM

## 2020-11-13 PROCEDURE — U0003 INFECTIOUS AGENT DETECTION BY NUCLEIC ACID (DNA OR RNA); SEVERE ACUTE RESPIRATORY SYNDROME CORONAVIRUS 2 (SARS-COV-2) (CORONAVIRUS DISEASE [COVID-19]), AMPLIFIED PROBE TECHNIQUE, MAKING USE OF HIGH THROUGHPUT TECHNOLOGIES AS DESCRIBED BY CMS-2020-01-R: HCPCS | Performed by: FAMILY MEDICINE

## 2020-11-15 LAB — SARS-COV-2 RNA SPEC QL NAA+PROBE: NOT DETECTED

## 2020-12-03 ENCOUNTER — TELEMEDICINE (OUTPATIENT)
Dept: FAMILY MEDICINE CLINIC | Facility: CLINIC | Age: 35
End: 2020-12-03
Payer: COMMERCIAL

## 2020-12-03 DIAGNOSIS — Z20.822 EXPOSURE TO COVID-19 VIRUS: Primary | ICD-10-CM

## 2020-12-03 PROCEDURE — 99213 OFFICE O/P EST LOW 20 MIN: CPT | Performed by: FAMILY MEDICINE

## 2020-12-07 DIAGNOSIS — Z20.822 EXPOSURE TO COVID-19 VIRUS: ICD-10-CM

## 2020-12-07 PROCEDURE — U0003 INFECTIOUS AGENT DETECTION BY NUCLEIC ACID (DNA OR RNA); SEVERE ACUTE RESPIRATORY SYNDROME CORONAVIRUS 2 (SARS-COV-2) (CORONAVIRUS DISEASE [COVID-19]), AMPLIFIED PROBE TECHNIQUE, MAKING USE OF HIGH THROUGHPUT TECHNOLOGIES AS DESCRIBED BY CMS-2020-01-R: HCPCS | Performed by: FAMILY MEDICINE

## 2020-12-09 LAB — SARS-COV-2 RNA SPEC QL NAA+PROBE: NOT DETECTED

## 2021-02-09 ENCOUNTER — OFFICE VISIT (OUTPATIENT)
Dept: CARDIOLOGY CLINIC | Facility: CLINIC | Age: 36
End: 2021-02-09
Payer: COMMERCIAL

## 2021-02-09 VITALS
WEIGHT: 210.7 LBS | HEIGHT: 70 IN | HEART RATE: 92 BPM | SYSTOLIC BLOOD PRESSURE: 128 MMHG | BODY MASS INDEX: 30.16 KG/M2 | DIASTOLIC BLOOD PRESSURE: 76 MMHG

## 2021-02-09 DIAGNOSIS — I10 HYPERTENSION, UNSPECIFIED TYPE: ICD-10-CM

## 2021-02-09 DIAGNOSIS — I10 ESSENTIAL HYPERTENSION: Primary | ICD-10-CM

## 2021-02-09 DIAGNOSIS — I49.3 PVC (PREMATURE VENTRICULAR CONTRACTION): ICD-10-CM

## 2021-02-09 DIAGNOSIS — R00.2 PALPITATIONS: ICD-10-CM

## 2021-02-09 PROCEDURE — 93000 ELECTROCARDIOGRAM COMPLETE: CPT | Performed by: INTERNAL MEDICINE

## 2021-02-09 PROCEDURE — 99244 OFF/OP CNSLTJ NEW/EST MOD 40: CPT | Performed by: INTERNAL MEDICINE

## 2021-02-09 RX ORDER — LISINOPRIL AND HYDROCHLOROTHIAZIDE 20; 12.5 MG/1; MG/1
1 TABLET ORAL DAILY
Qty: 90 TABLET | Refills: 3 | Status: SHIPPED | OUTPATIENT
Start: 2021-02-09 | End: 2021-05-13 | Stop reason: SDUPTHER

## 2021-02-09 NOTE — PROGRESS NOTES
Cardiology Consultation     Alessio Polk  [id number]  1985  HEART & VASCULAR [facility] [place] [address] [address] [id number]      1  Essential hypertension  POCT ECG    Metanephrine, Fractionated Plasma Free    Aldosterone    Renin Direct Assay    Holter monitor - 48 hour    Comprehensive metabolic panel    VAS renal artery complete    lisinopril-hydrochlorothiazide (PRINZIDE,ZESTORETIC) 20-12 5 MG per tablet   2  Hypertension, unspecified type         Discussion/Summary:      Hypertension from a young age  While I do suspect essential hypertension with a family history of this, I will complete his evaluation for secondary hypertension with some blood work, urinalysis, renal Dopplers  I reviewed his echocardiogram personally  There was no evidence of aortic coarctation  LV wall thickness was essentially normal       He has occasional chest tightness and symptoms which seems to come when his blood pressure spikes  We discussed the fluctuating nature blood pressure and the common triggers for this  I discussed some lifestyle changes including decreasing his sodium intake, increasing aerobic activity, and effort to lose a few lb  Because he is young and we wanted minimizes pole burden, I have made a slight adjustments to his medication today to increase the lisinopril and decrease the hydrochlorothiazide so that he can take combination pill  He will continue to monitor his blood pressure with this  While blood pressure was elevated initially by the medical assistant, I repeated this personally and was much better controlled  he reports palpitations  PVCs noted on his EKG  I will check a 48 hour Holter monitor for further evaluation    If he does have a reasonably high burden of these, we can always try just in his medical therapy to include beta-blocker to treat both of these as well       History of Present Illness:        77-year-old man comes to see me in the office today for essential hypertension  Tells me diagnosis of hypertension was made about a year and half ago, he has been on hydrochlorothiazide in the lisinopril was added  Blood pressure on average seems to be reasonably controlled  He had a stress echo a year and half ago which I personally reviewed  Essentially full resting images which are normal, and exercise tolerance and post exercise imaging was normal       Last week, he was feeling symptoms of headache, flushing, some chest tightness  This was also associated with palpitations  He took his blood pressure, it was in the 150s  He took it several times thereafter and remained high initially, but then a few hours later did improve  His symptoms resolved after that  He will feel this way on occasion  Denies any significant caffeine, alcohol, tobacco, over-the-counter [facility] supplement medications  He has had testing for sleep apnea previously which he tells me was negative, but he does snore heavily at night  He or his wife does the cooking mostly at home  He does not necessarily adhere to a low-sodium diet, but says in general his diet is pretty good  He works as a , and his job is reasonably active  He denies any major limitations with this  He has some orthopedic injuries  He takes occasional NSAIDs or Mobic, but does not take this regularly, and has not been taking this around the time that his blood pressure seems to be higher  There is a strong family history of hypertension  Two sisters have hypertension from a young age (one from her teens), and his father has HTN        Patient Active Problem List   Diagnosis    Lateral epicondylitis    Iliotibial band syndrome    Knee joint effusion    Obstructive sleep apnea    Exposure to COVID-19 virus    Viral URI    Essential hypertension     Past Medical History:   Diagnosis Date    GERD (gastroesophageal reflux disease)     Hypertension     Lateral epicondylitis      Social History     Tobacco Use    Smoking status: Never Smoker    Smokeless tobacco: Never Used   Substance Use Topics    Alcohol use: Yes     Comment: Social    Drug use: No      History reviewed  No pertinent family history  Past Surgical History:   Procedure Laterality Date    APPENDECTOMY      LATERAL EPICONDYLE RELEASE Right 6/27/2017    Procedure: ELBOW LATERAL EPICONDYLAR RELEASE;  Surgeon: Kt Best MD;  [facility];  Service: Orthopedics       Current Outpatient Medications:     meloxicam (MOBIC) 7 5 mg tablet, Take 1 tablet (7 5 mg total) by mouth daily as needed for moderate pain, Disp: 30 tablet, Rfl: 0    lisinopril-hydrochlorothiazide (PRINZIDE,ZESTORETIC) 20-12 5 MG per tablet, Take 1 tablet by mouth daily, Disp: 90 tablet, Rfl: 3  No Known Allergies    Vitals:    02/09/21 1456 02/09/21 1539   BP: 162/84 128/76   BP Location: Right arm Right arm   Patient Position: Sitting Sitting   Cuff Size: Large    Pulse: 92    Weight: 95 6 kg (210 lb 11 2 oz)    Height: 5' 10" (1 778 m)      Vitals:    02/09/21 1456   Weight: 95 6 kg (210 lb 11 2 oz)      Height: 5' 10" (177 8 cm)   Body mass index is 30 23 kg/m²      Physical Exam:  GENERAL: Alert, well appearing, and in no distress  HEENT:  PERRL, EOMI, no scleral icterus, no conjunctival pallor  NECK:  Supple, No elevated JVP, no thyromegaly, no carotid bruits  HEART:  Regular rate and rhythm, normal S1/S2, no S3/S4, no murmur or rub  LUNGS:  Clear to auscultation bilaterally  ABDOMEN:  Soft, non-tender, positive bowel sounds, no rebound or guarding  EXTREMITIES:  No edema  VASCULAR:  Normal pedal pulses   NEURO: No focal deficits,  SKIN: Normal without suspicious lesions on exposed skin      ROS:  Positive for  Snoring  Except as noted in HPI, is otherwise reviewed in detail and a 12 point review of systems is negative  Labs:  Lab Results   Component Value Date    K 4 1 08/31/2019     08/31/2019    CREATININE 0 86 08/31/2019    BUN 11 08/31/2019    CO2 27 08/31/2019    ALT 34 08/31/2019    AST 25 08/31/2019    GLUF 78 08/31/2019    WBC 7 05 08/31/2019    HGB 15 3 08/31/2019    HCT 45 9 08/31/2019     08/31/2019       Lab Results   Component Value Date    CHOL 145 11/18/2013     Lab Results   Component Value Date    HDL 33 (L) 08/31/2019    HDL 31 (L) 08/14/2018    HDL 32 (L) 11/18/2013     Lab Results   Component Value Date    LDLCALC 89 08/31/2019    LDLCALC 110 (H) 08/14/2018     Lab Results   Component Value Date    TRIG 123 08/31/2019    TRIG 95 08/14/2018    TRIG 92 11/18/2013     Testing:  Stress echo   10/22/19:  IMPRESSIONS:  1  Good exercise tolerance  2  Normal blood pressure response to exercise  3  Negative graded treadmill stress test for symptoms of angina pectoris or electrocardiographic changes of ischemia  4  Normal resting left ventricular function  5  Normal left ventricular systolic function response to exercise  6  Negative stress echocardiogram for evidence of a prior myocardial infarction or exercise induced myocardial ischemia  EKG:     sinus rhythm  PVC  92 beats per minute

## 2021-02-15 ENCOUNTER — LAB (OUTPATIENT)
Dept: LAB | Facility: MEDICAL CENTER | Age: 36
End: 2021-02-15
Payer: COMMERCIAL

## 2021-02-15 DIAGNOSIS — I10 ESSENTIAL HYPERTENSION: ICD-10-CM

## 2021-02-15 LAB
ALBUMIN SERPL BCP-MCNC: 4 G/DL (ref 3.5–5)
ALP SERPL-CCNC: 59 U/L (ref 46–116)
ALT SERPL W P-5'-P-CCNC: 36 U/L (ref 12–78)
ANION GAP SERPL CALCULATED.3IONS-SCNC: 2 MMOL/L (ref 4–13)
AST SERPL W P-5'-P-CCNC: 27 U/L (ref 5–45)
BILIRUB SERPL-MCNC: 2.18 MG/DL (ref 0.2–1)
BUN SERPL-MCNC: 14 MG/DL (ref 5–25)
CALCIUM SERPL-MCNC: 9.3 MG/DL (ref 8.3–10.1)
CHLORIDE SERPL-SCNC: 104 MMOL/L (ref 100–108)
CO2 SERPL-SCNC: 31 MMOL/L (ref 21–32)
CREAT SERPL-MCNC: 0.88 MG/DL (ref 0.6–1.3)
GFR SERPL CREATININE-BSD FRML MDRD: 111 ML/MIN/1.73SQ M
GLUCOSE P FAST SERPL-MCNC: 86 MG/DL (ref 65–99)
POTASSIUM SERPL-SCNC: 4.3 MMOL/L (ref 3.5–5.3)
PROT SERPL-MCNC: 7.9 G/DL (ref 6.4–8.2)
SODIUM SERPL-SCNC: 137 MMOL/L (ref 136–145)

## 2021-02-15 PROCEDURE — 83835 ASSAY OF METANEPHRINES: CPT | Performed by: INTERNAL MEDICINE

## 2021-02-15 PROCEDURE — 36415 COLL VENOUS BLD VENIPUNCTURE: CPT | Performed by: INTERNAL MEDICINE

## 2021-02-15 PROCEDURE — 82088 ASSAY OF ALDOSTERONE: CPT

## 2021-02-15 PROCEDURE — 84244 ASSAY OF RENIN: CPT

## 2021-02-15 PROCEDURE — 80053 COMPREHEN METABOLIC PANEL: CPT | Performed by: INTERNAL MEDICINE

## 2021-02-17 ENCOUNTER — HOSPITAL ENCOUNTER (OUTPATIENT)
Dept: NON INVASIVE DIAGNOSTICS | Facility: HOSPITAL | Age: 36
Discharge: HOME | End: 2021-02-17
Attending: INTERNAL MEDICINE
Payer: COMMERCIAL

## 2021-02-17 DIAGNOSIS — I10 ESSENTIAL HYPERTENSION: ICD-10-CM

## 2021-02-17 PROCEDURE — 93226 XTRNL ECG REC<48 HR SCAN A/R: CPT

## 2021-02-17 PROCEDURE — 93225 XTRNL ECG REC<48 HRS REC: CPT

## 2021-02-20 LAB — RENIN PLAS-CCNC: 6.9 NG/ML/HR (ref 0.17–5.38)

## 2021-02-21 LAB — ALDOST SERPL-MCNC: 8.4 NG/DL (ref 0–30)

## 2021-02-23 LAB
METANEPH FREE SERPL-MCNC: 43.4 PG/ML (ref 0–88)
NORMETANEPHRINE SERPL-MCNC: 81.2 PG/ML (ref 0–110.1)

## 2021-02-24 PROCEDURE — 93227 XTRNL ECG REC<48 HR R&I: CPT

## 2021-02-25 ENCOUNTER — HOSPITAL ENCOUNTER (OUTPATIENT)
Dept: NON INVASIVE DIAGNOSTICS | Facility: CLINIC | Age: 36
Discharge: HOME | End: 2021-02-25
Payer: COMMERCIAL

## 2021-02-25 DIAGNOSIS — I10 ESSENTIAL HYPERTENSION: ICD-10-CM

## 2021-02-25 PROCEDURE — 93975 VASCULAR STUDY: CPT

## 2021-02-25 PROCEDURE — 93975 VASCULAR STUDY: CPT | Performed by: SURGERY

## 2021-03-03 ENCOUNTER — TELEPHONE (OUTPATIENT)
Dept: CARDIOLOGY CLINIC | Facility: CLINIC | Age: 36
End: 2021-03-03

## 2021-03-03 NOTE — TELEPHONE ENCOUNTER
----- Message from Juan Crespo MD sent at 2/26/2021 12:50 PM EST -----  Please let patient know Holter monitor looked ok, no significant abnormalities

## 2021-03-03 NOTE — TELEPHONE ENCOUNTER
Attempted to contact patient to make him aware  Provided name and call back number for patient to return call

## 2021-03-30 DIAGNOSIS — Z23 ENCOUNTER FOR IMMUNIZATION: ICD-10-CM

## 2021-04-05 ENCOUNTER — IMMUNIZATIONS (OUTPATIENT)
Dept: FAMILY MEDICINE CLINIC | Facility: HOSPITAL | Age: 36
End: 2021-04-05

## 2021-04-05 DIAGNOSIS — Z23 ENCOUNTER FOR IMMUNIZATION: Primary | ICD-10-CM

## 2021-04-05 PROCEDURE — 91301 SARS-COV-2 / COVID-19 MRNA VACCINE (MODERNA) 100 MCG: CPT

## 2021-04-05 PROCEDURE — 0011A SARS-COV-2 / COVID-19 MRNA VACCINE (MODERNA) 100 MCG: CPT

## 2021-05-06 ENCOUNTER — IMMUNIZATIONS (OUTPATIENT)
Dept: FAMILY MEDICINE CLINIC | Facility: HOSPITAL | Age: 36
End: 2021-05-06

## 2021-05-06 DIAGNOSIS — Z23 ENCOUNTER FOR IMMUNIZATION: Primary | ICD-10-CM

## 2021-05-06 PROCEDURE — 91301 SARS-COV-2 / COVID-19 MRNA VACCINE (MODERNA) 100 MCG: CPT

## 2021-05-06 PROCEDURE — 0012A SARS-COV-2 / COVID-19 MRNA VACCINE (MODERNA) 100 MCG: CPT

## 2021-05-13 DIAGNOSIS — I10 ESSENTIAL HYPERTENSION: ICD-10-CM

## 2021-05-13 RX ORDER — LISINOPRIL AND HYDROCHLOROTHIAZIDE 20; 12.5 MG/1; MG/1
1 TABLET ORAL DAILY
Qty: 90 TABLET | Refills: 3 | Status: SHIPPED | OUTPATIENT
Start: 2021-05-13 | End: 2021-11-21 | Stop reason: SDUPTHER

## 2021-07-02 ENCOUNTER — APPOINTMENT (OUTPATIENT)
Dept: RADIOLOGY | Facility: MEDICAL CENTER | Age: 36
End: 2021-07-02
Payer: OTHER MISCELLANEOUS

## 2021-07-02 ENCOUNTER — OCCMED (OUTPATIENT)
Dept: URGENT CARE | Facility: MEDICAL CENTER | Age: 36
End: 2021-07-02
Payer: OTHER MISCELLANEOUS

## 2021-07-02 DIAGNOSIS — M25.521 RIGHT ELBOW PAIN: ICD-10-CM

## 2021-07-02 DIAGNOSIS — M25.521 RIGHT ELBOW PAIN: Primary | ICD-10-CM

## 2021-07-02 DIAGNOSIS — M79.644 PAIN OF RIGHT THUMB: ICD-10-CM

## 2021-07-02 PROCEDURE — 99283 EMERGENCY DEPT VISIT LOW MDM: CPT | Performed by: PHYSICIAN ASSISTANT

## 2021-07-02 PROCEDURE — G0382 LEV 3 HOSP TYPE B ED VISIT: HCPCS | Performed by: PHYSICIAN ASSISTANT

## 2021-07-02 PROCEDURE — 73130 X-RAY EXAM OF HAND: CPT

## 2021-07-02 PROCEDURE — 73080 X-RAY EXAM OF ELBOW: CPT

## 2021-07-07 ENCOUNTER — APPOINTMENT (OUTPATIENT)
Dept: URGENT CARE | Facility: MEDICAL CENTER | Age: 36
End: 2021-07-07
Payer: OTHER MISCELLANEOUS

## 2021-07-07 PROCEDURE — 99213 OFFICE O/P EST LOW 20 MIN: CPT | Performed by: PHYSICIAN ASSISTANT

## 2021-07-14 ENCOUNTER — APPOINTMENT (OUTPATIENT)
Dept: URGENT CARE | Facility: MEDICAL CENTER | Age: 36
End: 2021-07-14
Payer: OTHER MISCELLANEOUS

## 2021-07-14 PROCEDURE — 99213 OFFICE O/P EST LOW 20 MIN: CPT | Performed by: PHYSICIAN ASSISTANT

## 2021-08-23 DIAGNOSIS — I10 ESSENTIAL HYPERTENSION: ICD-10-CM

## 2021-08-23 RX ORDER — LISINOPRIL AND HYDROCHLOROTHIAZIDE 20; 12.5 MG/1; MG/1
1 TABLET ORAL DAILY
Qty: 90 TABLET | Refills: 0 | Status: CANCELLED | OUTPATIENT
Start: 2021-08-23

## 2021-08-23 NOTE — TELEPHONE ENCOUNTER
Called [facility] to confirm patient has refills on Lisinopril-Hydrochlorothiazide due to it just feeling filled in May  Aamir from [facility] confirmed they have refills  Called and left message making patient aware

## 2021-10-01 ENCOUNTER — OFFICE VISIT (OUTPATIENT)
Dept: FAMILY MEDICINE CLINIC | Facility: CLINIC | Age: 36
End: 2021-10-01
Payer: COMMERCIAL

## 2021-10-01 VITALS
TEMPERATURE: 97.8 F | DIASTOLIC BLOOD PRESSURE: 64 MMHG | HEIGHT: 70 IN | OXYGEN SATURATION: 99 % | HEART RATE: 102 BPM | BODY MASS INDEX: 29.72 KG/M2 | SYSTOLIC BLOOD PRESSURE: 118 MMHG | WEIGHT: 207.6 LBS | RESPIRATION RATE: 18 BRPM

## 2021-10-01 DIAGNOSIS — R63.4 WEIGHT LOSS: ICD-10-CM

## 2021-10-01 DIAGNOSIS — R53.83 FATIGUE, UNSPECIFIED TYPE: ICD-10-CM

## 2021-10-01 DIAGNOSIS — R73.9 ELEVATED BLOOD SUGAR: Primary | ICD-10-CM
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
## 2021-10-01 DIAGNOSIS — I10 ESSENTIAL HYPERTENSION: ICD-10-CM

## 2021-10-01 PROCEDURE — 99214 OFFICE O/P EST MOD 30 MIN: CPT | Performed by: FAMILY MEDICINE

## 2021-10-11 ENCOUNTER — APPOINTMENT (OUTPATIENT)
Dept: LAB | Facility: MEDICAL CENTER | Age: 36
End: 2021-10-11
Payer: COMMERCIAL

## 2021-10-11 DIAGNOSIS — I10 ESSENTIAL HYPERTENSION: ICD-10-CM

## 2021-10-11 DIAGNOSIS — R73.9 ELEVATED BLOOD SUGAR: ICD-10-CM

## 2021-10-11 DIAGNOSIS — R53.83 FATIGUE, UNSPECIFIED TYPE: ICD-10-CM
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
## 2021-10-11 LAB
ALBUMIN SERPL BCP-MCNC: 3.7 G/DL (ref 3.5–5)
ALP SERPL-CCNC: 55 U/L (ref 46–116)
ALT SERPL W P-5'-P-CCNC: 32 U/L (ref 12–78)
ANION GAP SERPL CALCULATED.3IONS-SCNC: 3 MMOL/L (ref 4–13)
AST SERPL W P-5'-P-CCNC: 23 U/L (ref 5–45)
BILIRUB SERPL-MCNC: 0.96 MG/DL (ref 0.2–1)
BUN SERPL-MCNC: 15 MG/DL (ref 5–25)
CALCIUM SERPL-MCNC: 9.5 MG/DL (ref 8.3–10.1)
CHLORIDE SERPL-SCNC: 105 MMOL/L (ref 100–108)
CHOLEST SERPL-MCNC: 155 MG/DL (ref 50–200)
CO2 SERPL-SCNC: 28 MMOL/L (ref 21–32)
CREAT SERPL-MCNC: 0.91 MG/DL (ref 0.6–1.3)
ERYTHROCYTE [DISTWIDTH] IN BLOOD BY AUTOMATED COUNT: 12.1 % (ref 11.6–15.1)
EST. AVERAGE GLUCOSE BLD GHB EST-MCNC: 100 MG/DL
GFR SERPL CREATININE-BSD FRML MDRD: 107 ML/MIN/1.73SQ M
GLUCOSE P FAST SERPL-MCNC: 84 MG/DL (ref 65–99)
HBA1C MFR BLD: 5.1 %
HCT VFR BLD AUTO: 43.5 % (ref 36.5–49.3)
HDLC SERPL-MCNC: 36 MG/DL
HGB BLD-MCNC: 14.7 G/DL (ref 12–17)
LDLC SERPL CALC-MCNC: 106 MG/DL (ref 0–100)
MCH RBC QN AUTO: 31.7 PG (ref 26.8–34.3)
MCHC RBC AUTO-ENTMCNC: 33.8 G/DL (ref 31.4–37.4)
MCV RBC AUTO: 94 FL (ref 82–98)
NONHDLC SERPL-MCNC: 119 MG/DL
PLATELET # BLD AUTO: 238 THOUSANDS/UL (ref 149–390)
PMV BLD AUTO: 10.3 FL (ref 8.9–12.7)
POTASSIUM SERPL-SCNC: 4.1 MMOL/L (ref 3.5–5.3)
PROT SERPL-MCNC: 7.6 G/DL (ref 6.4–8.2)
RBC # BLD AUTO: 4.64 MILLION/UL (ref 3.88–5.62)
SODIUM SERPL-SCNC: 136 MMOL/L (ref 136–145)
TRIGL SERPL-MCNC: 67 MG/DL
TSH SERPL DL<=0.05 MIU/L-ACNC: 1.02 UIU/ML (ref 0.36–3.74)
WBC # BLD AUTO: 6.33 THOUSAND/UL (ref 4.31–10.16)

## 2021-10-11 PROCEDURE — 85027 COMPLETE CBC AUTOMATED: CPT

## 2021-10-11 PROCEDURE — 83036 HEMOGLOBIN GLYCOSYLATED A1C: CPT

## 2021-10-11 PROCEDURE — 80053 COMPREHEN METABOLIC PANEL: CPT

## 2021-10-11 PROCEDURE — 80061 LIPID PANEL: CPT

## 2021-10-11 PROCEDURE — 86618 LYME DISEASE ANTIBODY: CPT

## 2021-10-11 PROCEDURE — 36415 COLL VENOUS BLD VENIPUNCTURE: CPT

## 2021-10-11 PROCEDURE — 84443 ASSAY THYROID STIM HORMONE: CPT

## 2021-10-12 LAB — B BURGDOR IGG+IGM SER-ACNC: 44

## 2021-11-21 DIAGNOSIS — I10 ESSENTIAL HYPERTENSION: ICD-10-CM

## 2021-11-22 RX ORDER — LISINOPRIL AND HYDROCHLOROTHIAZIDE 20; 12.5 MG/1; MG/1
1 TABLET ORAL DAILY
Qty: 90 TABLET | Refills: 2 | Status: SHIPPED | OUTPATIENT
Start: 2021-11-22 | End: 2022-02-21 | Stop reason: SDUPTHER

## 2022-02-21 DIAGNOSIS — I10 ESSENTIAL HYPERTENSION: ICD-10-CM

## 2022-02-21 RX ORDER — LISINOPRIL AND HYDROCHLOROTHIAZIDE 20; 12.5 MG/1; MG/1
1 TABLET ORAL DAILY
Qty: 90 TABLET | Refills: 2 | Status: SHIPPED | OUTPATIENT
Start: 2022-02-21 | End: 2022-06-01 | Stop reason: SDUPTHER

## 2022-06-01 ENCOUNTER — OFFICE VISIT (OUTPATIENT)
Dept: FAMILY MEDICINE CLINIC | Facility: CLINIC | Age: 37
End: 2022-06-01
Payer: COMMERCIAL

## 2022-06-01 VITALS
TEMPERATURE: 98 F | BODY MASS INDEX: 30.78 KG/M2 | SYSTOLIC BLOOD PRESSURE: 124 MMHG | OXYGEN SATURATION: 97 % | DIASTOLIC BLOOD PRESSURE: 72 MMHG | WEIGHT: 215 LBS | HEART RATE: 85 BPM | HEIGHT: 70 IN

## 2022-06-01 DIAGNOSIS — I10 ESSENTIAL HYPERTENSION: ICD-10-CM

## 2022-06-01 DIAGNOSIS — M79.601 RIGHT ARM PAIN: ICD-10-CM

## 2022-06-01 DIAGNOSIS — G56.21 CUBITAL TUNNEL SYNDROME ON RIGHT: ICD-10-CM

## 2022-06-01 DIAGNOSIS — M54.50 ACUTE RIGHT-SIDED LOW BACK PAIN WITHOUT SCIATICA: Primary | ICD-10-CM

## 2022-06-01 PROCEDURE — 99213 OFFICE O/P EST LOW 20 MIN: CPT | Performed by: FAMILY MEDICINE

## 2022-06-01 RX ORDER — MELOXICAM 15 MG/1
15 TABLET ORAL DAILY
Qty: 15 TABLET | Refills: 1 | Status: SHIPPED | OUTPATIENT
Start: 2022-06-01

## 2022-06-01 RX ORDER — CYCLOBENZAPRINE HCL 10 MG
10 TABLET ORAL
Qty: 15 TABLET | Refills: 1 | Status: SHIPPED | OUTPATIENT
Start: 2022-06-01

## 2022-06-01 RX ORDER — LISINOPRIL AND HYDROCHLOROTHIAZIDE 20; 12.5 MG/1; MG/1
1 TABLET ORAL DAILY
Qty: 90 TABLET | Refills: 2 | Status: SHIPPED | OUTPATIENT
Start: 2022-06-01

## 2022-06-01 NOTE — PROGRESS NOTES
[facility]      NAME: Angie Madison  AGE: 44 y o  SEX: male  : 1985   MRN: [id number]    DATE: 2022  TIME: 2:23 PM    Assessment and Plan     Acute lumbar back pain due to muscular strain  Her recommend moist heat and stretches  Prescription for muscle relaxant at bedtime and NSAID in the morning  Also discussed history of cubital tunnel syndrome on right upper extremity  He had seen hand surgeon last year and was preparing for surgery when that physician left the Network  He would now like a referral for a 2nd opinion  Problem List Items Addressed This Visit     Essential hypertension    Relevant Medications    lisinopril-hydrochlorothiazide (PRINZIDE,ZESTORETIC) 20-12 5 MG per tablet      Other Visit Diagnoses     Acute right-sided low back pain without sciatica    -  Primary    Relevant Medications    meloxicam (Mobic) 15 mg tablet    cyclobenzaprine (FLEXERIL) 10 mg tablet    Right arm pain        Cubital tunnel syndrome on right        Relevant Orders    Ambulatory referral to Orthopedic Surgery              Return to office in:  P r n  Chief Complaint     Chief Complaint   Patient presents with    Back Pain     Right lower back pain        History of Present Illness     Patient presents with a chief complaint of right-sided low back pain which began last week  He has had several incidents of acute pain in the right side without radiation  He has not taken any medication for it  He does not recall any specific injury or incident  He has had no change in bowel or bladder habit  He has no numbness or saddle anesthesia  The following portions of the patient's history were reviewed and updated as appropriate: allergies, current medications, past family history, past medical history, past social history, past surgical history and problem list     Review of Systems   Review of Systems   Constitutional: Negative  Respiratory: Negative  Cardiovascular: Negative  Gastrointestinal: Negative  Genitourinary: Negative  Musculoskeletal: Positive for back pain  Psychiatric/Behavioral: Negative  Active Problem List     Patient Active Problem List   Diagnosis    Lateral epicondylitis    Iliotibial band syndrome    Knee joint effusion    Obstructive sleep apnea    Exposure to COVID-19 virus    Viral URI    Essential hypertension    Weight loss    Elevated blood sugar    Fatigue       Objective   /72 (BP Location: Left arm, Patient Position: Sitting, Cuff Size: Large)   Pulse 85   Temp 98 °F (36 7 °C) (Tympanic)   Ht 5' 9 5" (1 765 m)   Wt 97 5 kg (215 lb)   SpO2 97%   BMI 31 29 kg/m²     Physical Exam  Vitals and nursing note reviewed  Constitutional:       General: He is not in acute distress  Appearance: He is well-developed  He is not diaphoretic  HENT:      Head: Normocephalic and atraumatic  Eyes:      General:         Right eye: No discharge  Conjunctiva/sclera: Conjunctivae normal       Pupils: Pupils are equal, round, and reactive to light  Neck:      Thyroid: No thyromegaly  Cardiovascular:      Rate and Rhythm: Normal rate and regular rhythm  Pulmonary:      Effort: Pulmonary effort is normal  No respiratory distress  Breath sounds: Normal breath sounds  Musculoskeletal:      Cervical back: Normal range of motion  Comments: Tenderness right lumbar region with spasm   Lymphadenopathy:      Cervical: No cervical adenopathy  Skin:     General: Skin is warm and dry  Neurological:      Mental Status: He is alert and oriented to person, place, and time  Psychiatric:         Behavior: Behavior normal          Thought Content:  Thought content normal          Judgment: Judgment normal            Current Medications     Current Outpatient Medications:     cyclobenzaprine (FLEXERIL) 10 mg tablet, Take 1 tablet (10 mg total) by mouth daily at bedtime, Disp: 15 tablet, Rfl: 1   lisinopril-hydrochlorothiazide (PRINZIDE,ZESTORETIC) 20-12 5 MG per tablet, Take 1 tablet by mouth daily, Disp: 90 tablet, Rfl: 2    meloxicam (Mobic) 15 mg tablet, Take 1 tablet (15 mg total) by mouth daily In am, Disp: 15 tablet, Rfl: 1    Health Maintenance     Health Maintenance   Topic Date Due    Hepatitis C Screening  Never done    HIV Screening  Never done    BMI: Followup Plan  Never done    Annual Physical  09/09/2020    COVID-19 Vaccine (3 - Booster for Moderna series) 10/06/2021    Influenza Vaccine (Season Ended) 09/01/2022    Depression Screening  10/01/2022    BMI: Adult  06/01/2023    DTaP,Tdap,and Td Vaccines (2 - Td or Tdap) 02/09/2026    Pneumococcal Vaccine: Pediatrics (0 to 5 Years) and At-Risk Patients (6 to 59 Years)  Aged Out    HIB Vaccine  Aged Out    Hepatitis B Vaccine  Aged Out    IPV Vaccine  Aged Out    Hepatitis A Vaccine  Aged Out    Meningococcal ACWY Vaccine  Aged Out    HPV Vaccine  Aged [employer] History   Administered Date(s) Administered    COVID-19 MODERNA VACC 0 5 ML IM 04/05/2021, 05/06/2021    Influenza, injectable, quadrivalent, preservative free 0 5 mL 10/10/2019    Influenza, seasonal, injectable 10/25/2020    Tdap 02/09/2016       Ania Fields DO  [facility]

## 2022-08-30 ENCOUNTER — TELEPHONE (OUTPATIENT)
Dept: FAMILY MEDICINE CLINIC | Facility: CLINIC | Age: 37
End: 2022-08-30

## 2022-08-30 NOTE — TELEPHONE ENCOUNTER
Pt called and LMOM to call him back in regards to insurance change  I spoke with pt and he will send a copy of the front/back of his insurance for us to update his chart     Once insurance is updated, he will need a refill on: lisinopril-hydrochlorothiazide (PRINZIDE,ZESTORETIC) 20-12 5 MG per tablet

## 2022-09-02 NOTE — TELEPHONE ENCOUNTER
Spoke with patient,his insurance was updated and he said the pharmacy refilled his meds for him so this has already been taken care of

## 2022-09-30 ENCOUNTER — TELEPHONE (OUTPATIENT)
Dept: CARDIOLOGY CLINIC | Facility: CLINIC | Age: 37
End: 2022-09-30

## 2022-09-30 DIAGNOSIS — I10 ESSENTIAL HYPERTENSION: ICD-10-CM

## 2022-09-30 RX ORDER — LISINOPRIL AND HYDROCHLOROTHIAZIDE 20; 12.5 MG/1; MG/1
1 TABLET ORAL DAILY
Qty: 30 TABLET | Refills: 0 | Status: SHIPPED | OUTPATIENT
Start: 2022-09-30

## 2022-11-08 DIAGNOSIS — I10 ESSENTIAL HYPERTENSION: ICD-10-CM

## 2022-11-08 RX ORDER — LISINOPRIL AND HYDROCHLOROTHIAZIDE 20; 12.5 MG/1; MG/1
1 TABLET ORAL DAILY
Qty: 10 TABLET | Refills: 0 | Status: SHIPPED | OUTPATIENT
Start: 2022-11-08

## 2022-11-15 ENCOUNTER — TELEPHONE (OUTPATIENT)
Dept: CARDIOLOGY CLINIC | Facility: CLINIC | Age: 37
End: 2022-11-15

## 2022-11-15 NOTE — TELEPHONE ENCOUNTER
Pt overdue for an ov  He has called a few times for refills after scheduling an appt with Dr Yael Dc-- then cancels the appt  On 11/8/22 I provided 10 days worth of Losartan until seen by Dr Yael Dc 11/16/22, pt cancelled appt again  Pt now rescheduled appt for 12/28/22

## 2022-11-23 ENCOUNTER — TELEPHONE (OUTPATIENT)
Dept: CARDIOLOGY CLINIC | Facility: CLINIC | Age: 37
End: 2022-11-23

## 2022-11-23 DIAGNOSIS — I10 ESSENTIAL HYPERTENSION: ICD-10-CM

## 2022-11-23 NOTE — TELEPHONE ENCOUNTER
Patient is overdue-He makes appointment then Cx-he's calling again for refill he was given 10 day supply-until 11/16 & Cx appointment- He also cx 11/8/22  Hes requesting losartan  ? He made another appointment 12/28   is it ok for refill?

## 2022-11-25 RX ORDER — LISINOPRIL AND HYDROCHLOROTHIAZIDE 20; 12.5 MG/1; MG/1
1 TABLET ORAL DAILY
Qty: 30 TABLET | Refills: 1 | Status: SHIPPED | OUTPATIENT
Start: 2022-11-25

## 2022-12-28 ENCOUNTER — OFFICE VISIT (OUTPATIENT)
Dept: CARDIOLOGY CLINIC | Facility: CLINIC | Age: 37
End: 2022-12-28

## 2022-12-28 VITALS
BODY MASS INDEX: 30.75 KG/M2 | SYSTOLIC BLOOD PRESSURE: 140 MMHG | WEIGHT: 214.8 LBS | DIASTOLIC BLOOD PRESSURE: 80 MMHG | TEMPERATURE: 99.1 F | OXYGEN SATURATION: 97 % | HEART RATE: 82 BPM | HEIGHT: 70 IN

## 2022-12-28 DIAGNOSIS — I10 ESSENTIAL HYPERTENSION: Primary | ICD-10-CM

## 2022-12-28 RX ORDER — LISINOPRIL AND HYDROCHLOROTHIAZIDE 20; 12.5 MG/1; MG/1
1 TABLET ORAL DAILY
Qty: 90 TABLET | Refills: 3 | Status: SHIPPED | OUTPATIENT
Start: 2022-12-28

## 2022-12-28 NOTE — PROGRESS NOTES
Cardiology Follow Up    Jonatan Shepherd  [id number]  1985  [facility] [address] [place] [address] [place]  [id number] [phone]      1  Essential hypertension  POCT ECG    Comprehensive metabolic panel    Lipid Panel with Direct LDL reflex    TSH, 3rd generation with Free T4 reflex    CBC and differential    lisinopril-hydrochlorothiazide (PRINZIDE,ZESTORETIC) 20-12 5 MG per tablet          Discussion/Summary:    Hypertension is controlled with lisinopril/HCTZ  Was refilled to his pharmacy, and I ordered regular blood work to monitor kidney function and electrolytes  Has palpitations  Previous testing has revealed isolated PVCs  These are relatively infrequent and not bothersome  No other associated symptoms  If worsened in the future, can repeat monitoring  Echocardiogram previously done as part of stress echo was unremarkable  Screening lipid profile ordered  Previous History:   Hypertension  Palpitations, has had PVCs on Holter monitor previously  Returns today, overdue for follow-up  Generally, he has been doing well but continues to feel palpitations  These come pretty randomly can last about 15 minutes at a time and then resolve spontaneously  No other associated symptoms including dizziness, lightheadedness, shortness of breath, pain/pressure, or syncope  No obvious triggers for these are identified  Has been on his antihypertensive regimen without interruption, but has run out of pills and needs a refill today  No recent blood work has been done  58-year-old man comes to see me in the office today for essential hypertension  Tells me diagnosis of hypertension was made about a year and half ago, he has been on hydrochlorothiazide in the lisinopril was added  Blood pressure on average seems to be reasonably controlled  He had a stress echo a year and half ago which I personally reviewed    Essentially full resting images which are normal, and exercise tolerance and post exercise imaging was normal       Last week, he was feeling symptoms of headache, flushing, some chest tightness  This was also associated with palpitations  He took his blood pressure, it was in the 150s  He took it several times thereafter and remained high initially, but then a few hours later did improve  His symptoms resolved after that  He will feel this way on occasion  Denies any significant caffeine, alcohol, tobacco, over-the-counter [place] supplement medications  He has had testing for sleep apnea previously which he tells me was negative, but he does snore heavily at night  He or his wife does the cooking mostly at home  He does not necessarily adhere to a low-sodium diet, but says in general his diet is pretty good  He works as a , and his job is reasonably active  He denies any major limitations with this  He has some orthopedic injuries  He takes occasional NSAIDs or Mobic, but does not take this regularly, and has not been taking this around the time that his blood pressure seems to be higher  There is a strong family history of hypertension  Two sisters have hypertension from a young age (one from her teens), and his father has HTN  Patient Active Problem List   Diagnosis   • Lateral epicondylitis   • Iliotibial band syndrome   • Knee joint effusion   • Obstructive sleep apnea   • Exposure to COVID-19 virus   • Viral URI   • Essential hypertension   • Weight loss   • Elevated blood sugar   • Fatigue     Past Medical History:   Diagnosis Date   • GERD (gastroesophageal reflux disease)    • Hypertension    • Lateral epicondylitis      Social History     Tobacco Use   • Smoking status: Never   • Smokeless tobacco: Never   Vaping Use   • Vaping Use: Never used   Substance Use Topics   • Alcohol use: Yes     Comment: Social   • Drug use: No      No family history on file    Past Surgical History:   Procedure Laterality Date   • APPENDECTOMY     • LATERAL EPICONDYLE RELEASE Right 6/27/2017    Procedure: ELBOW LATERAL EPICONDYLAR RELEASE;  Surgeon: Adonis Huynh MD;  Location[facility];  Service: Orthopedics       Current Outpatient Medications:   •  cyclobenzaprine (FLEXERIL) 10 mg tablet, Take 1 tablet (10 mg total) by mouth daily at bedtime, Disp: 15 tablet, Rfl: 1  •  lisinopril-hydrochlorothiazide (PRINZIDE,ZESTORETIC) 20-12 5 MG per tablet, Take 1 tablet by mouth daily, Disp: 90 tablet, Rfl: 3  •  meloxicam (Mobic) 15 mg tablet, Take 1 tablet (15 mg total) by mouth daily In am, Disp: 15 tablet, Rfl: 1  No Known Allergies    Vitals:    12/28/22 1504   BP: 140/80   BP Location: Left arm   Patient Position: Sitting   Cuff Size: Standard   Pulse: 82   Temp: 99 1 °F (37 3 °C)   SpO2: 97%   Weight: 97 4 kg (214 lb 12 8 oz)   Height: 5' 9 5" (1 765 m)     Vitals:    12/28/22 1504   Weight: 97 4 kg (214 lb 12 8 oz)      Height: 5' 9 5" (176 5 cm)   Body mass index is 31 27 kg/m²  Physical Exam:  GEN: [place] appears well, alert and oriented x 3, pleasant and cooperative   HEENT: pupils equal, round, and reactive to light; extraocular muscles intact  NECK: supple, no carotid bruits   HEART: regular rhythm, normal S1 and S2, no murmurs, clicks, gallops or rubs   LUNGS: clear to auscultation bilaterally; no wheezes, rales, or rhonchi   ABDOMEN: normal bowel sounds, soft, no tenderness, no distention  EXTREMITIES: peripheral pulses normal; no clubbing, cyanosis, or edema  NEURO: no focal findings   SKIN: normal without suspicious lesions on exposed skin    ROS:  Positive for  Snoring  Except as noted in HPI, is otherwise reviewed in detail and a 12 point review of systems is negative    ROS reviewed and is unchanged    Labs:  Lab Results   Component Value Date    K 4 1 10/11/2021     10/11/2021    CREATININE 0 91 10/11/2021    BUN 15 10/11/2021    CO2 28 10/11/2021 ALT 32 10/11/2021    AST 23 10/11/2021    GLUF 84 10/11/2021    HGBA1C 5 1 10/11/2021    WBC 6 33 10/11/2021    HGB 14 7 10/11/2021    HCT 43 5 10/11/2021     10/11/2021       Lab Results   Component Value Date    CHOL 145 11/18/2013     Lab Results   Component Value Date    HDL 36 (L) 10/11/2021    HDL 33 (L) 08/31/2019    HDL 31 (L) 08/14/2018     Lab Results   Component Value Date    LDLCALC 106 (H) 10/11/2021    LDLCALC 89 08/31/2019    LDLCALC 110 (H) 08/14/2018     Lab Results   Component Value Date    TRIG 67 10/11/2021    TRIG 123 08/31/2019    TRIG 95 08/14/2018     Testing:  Holter 2/2021  CONCLUSIONS:  Holter monitor reveals the underlying rhythm is sinus rhythm with an average heart rate of 82 beats per minute, a minimum heart rate of 54 beats per minute and a maximum heart rate of 140 beats per minute  There were about 3812 ventricular ectopic beats, mostly occurring as single PVC's with no evidence of ventricular tachycardia  There were about 9 supraventricular ectopic beats, mostly occurring as single PAC's and late beats with no evidence of supraventricular tachycardia, atrial fibrillation, or atrial flutter  There is no evidence of significant bradyarrhythmia or advanced heart block  The longest R to R was 1 4 seconds  The patient's symptom diary reported shortness of breath and palpitations  This was associated with sinus rhythm with rates 80s-100 bpm and a single symptomatic PVC  Stress echo   10/22/19:  IMPRESSIONS:  1  Good exercise tolerance  2  Normal blood pressure response to exercise  3  Negative graded treadmill stress test for symptoms of angina pectoris or electrocardiographic changes of ischemia  4  Normal resting left ventricular function  5  Normal left ventricular systolic function response to exercise  6  Negative stress echocardiogram for evidence of a prior myocardial infarction or exercise induced myocardial ischemia  EKG:  Sinus rhythm  82 bpm  Normal EKG

## 2023-01-18 ENCOUNTER — APPOINTMENT (OUTPATIENT)
Dept: LAB | Facility: MEDICAL CENTER | Age: 38
End: 2023-01-18

## 2023-01-18 DIAGNOSIS — I10 ESSENTIAL HYPERTENSION: ICD-10-CM

## 2023-01-18 LAB
ALBUMIN SERPL BCP-MCNC: 3.8 G/DL (ref 3.5–5)
ALP SERPL-CCNC: 69 U/L (ref 46–116)
ALT SERPL W P-5'-P-CCNC: 52 U/L (ref 12–78)
ANION GAP SERPL CALCULATED.3IONS-SCNC: 3 MMOL/L (ref 4–13)
AST SERPL W P-5'-P-CCNC: 30 U/L (ref 5–45)
BASOPHILS # BLD AUTO: 0.03 THOUSANDS/ÂΜL (ref 0–0.1)
BASOPHILS NFR BLD AUTO: 1 % (ref 0–1)
BILIRUB SERPL-MCNC: 1.02 MG/DL (ref 0.2–1)
BUN SERPL-MCNC: 14 MG/DL (ref 5–25)
CALCIUM SERPL-MCNC: 9.1 MG/DL (ref 8.3–10.1)
CHLORIDE SERPL-SCNC: 103 MMOL/L (ref 96–108)
CHOLEST SERPL-MCNC: 151 MG/DL
CO2 SERPL-SCNC: 29 MMOL/L (ref 21–32)
CREAT SERPL-MCNC: 0.94 MG/DL (ref 0.6–1.3)
EOSINOPHIL # BLD AUTO: 0.08 THOUSAND/ÂΜL (ref 0–0.61)
EOSINOPHIL NFR BLD AUTO: 1 % (ref 0–6)
ERYTHROCYTE [DISTWIDTH] IN BLOOD BY AUTOMATED COUNT: 11.9 % (ref 11.6–15.1)
GFR SERPL CREATININE-BSD FRML MDRD: 102 ML/MIN/1.73SQ M
GLUCOSE P FAST SERPL-MCNC: 76 MG/DL (ref 65–99)
HCT VFR BLD AUTO: 43.9 % (ref 36.5–49.3)
HDLC SERPL-MCNC: 37 MG/DL
HGB BLD-MCNC: 15.1 G/DL (ref 12–17)
IMM GRANULOCYTES # BLD AUTO: 0.02 THOUSAND/UL (ref 0–0.2)
IMM GRANULOCYTES NFR BLD AUTO: 0 % (ref 0–2)
LDLC SERPL CALC-MCNC: 101 MG/DL (ref 0–100)
LYMPHOCYTES # BLD AUTO: 2.2 THOUSANDS/ÂΜL (ref 0.6–4.47)
LYMPHOCYTES NFR BLD AUTO: 36 % (ref 14–44)
MCH RBC QN AUTO: 31.8 PG (ref 26.8–34.3)
MCHC RBC AUTO-ENTMCNC: 34.4 G/DL (ref 31.4–37.4)
MCV RBC AUTO: 92 FL (ref 82–98)
MONOCYTES # BLD AUTO: 0.7 THOUSAND/ÂΜL (ref 0.17–1.22)
MONOCYTES NFR BLD AUTO: 11 % (ref 4–12)
NEUTROPHILS # BLD AUTO: 3.1 THOUSANDS/ÂΜL (ref 1.85–7.62)
NEUTS SEG NFR BLD AUTO: 51 % (ref 43–75)
NRBC BLD AUTO-RTO: 0 /100 WBCS
PLATELET # BLD AUTO: 286 THOUSANDS/UL (ref 149–390)
PMV BLD AUTO: 9.3 FL (ref 8.9–12.7)
POTASSIUM SERPL-SCNC: 3.8 MMOL/L (ref 3.5–5.3)
PROT SERPL-MCNC: 7.7 G/DL (ref 6.4–8.4)
RBC # BLD AUTO: 4.75 MILLION/UL (ref 3.88–5.62)
SODIUM SERPL-SCNC: 135 MMOL/L (ref 135–147)
TRIGL SERPL-MCNC: 67 MG/DL
TSH SERPL DL<=0.05 MIU/L-ACNC: 1.32 UIU/ML (ref 0.45–4.5)
WBC # BLD AUTO: 6.13 THOUSAND/UL (ref 4.31–10.16)

## 2023-01-26 DIAGNOSIS — I10 ESSENTIAL HYPERTENSION: ICD-10-CM

## 2023-01-26 RX ORDER — LISINOPRIL AND HYDROCHLOROTHIAZIDE 20; 12.5 MG/1; MG/1
1 TABLET ORAL DAILY
Qty: 90 TABLET | Refills: 0 | Status: SHIPPED | OUTPATIENT
Start: 2023-01-26

## 2023-03-25 DIAGNOSIS — I10 ESSENTIAL HYPERTENSION: ICD-10-CM

## 2023-03-27 RX ORDER — LISINOPRIL AND HYDROCHLOROTHIAZIDE 20; 12.5 MG/1; MG/1
1 TABLET ORAL DAILY
Qty: 90 TABLET | Refills: 0 | Status: SHIPPED | OUTPATIENT
Start: 2023-03-27

## 2023-04-27 DIAGNOSIS — I10 ESSENTIAL HYPERTENSION: ICD-10-CM

## 2023-04-28 RX ORDER — LISINOPRIL AND HYDROCHLOROTHIAZIDE 20; 12.5 MG/1; MG/1
1 TABLET ORAL DAILY
Qty: 90 TABLET | Refills: 0 | Status: SHIPPED | OUTPATIENT
Start: 2023-04-28

## 2023-05-30 DIAGNOSIS — I10 ESSENTIAL HYPERTENSION: ICD-10-CM

## 2023-05-30 RX ORDER — LISINOPRIL AND HYDROCHLOROTHIAZIDE 20; 12.5 MG/1; MG/1
1 TABLET ORAL DAILY
Qty: 90 TABLET | Refills: 0 | Status: SHIPPED | OUTPATIENT
Start: 2023-05-30

## 2023-07-07 DIAGNOSIS — I10 ESSENTIAL HYPERTENSION: ICD-10-CM

## 2023-07-07 RX ORDER — LISINOPRIL AND HYDROCHLOROTHIAZIDE 20; 12.5 MG/1; MG/1
1 TABLET ORAL DAILY
Qty: 90 TABLET | Refills: 0 | Status: SHIPPED | OUTPATIENT
Start: 2023-07-07

## 2023-08-15 DIAGNOSIS — I10 ESSENTIAL HYPERTENSION: ICD-10-CM

## 2023-08-15 RX ORDER — LISINOPRIL AND HYDROCHLOROTHIAZIDE 20; 12.5 MG/1; MG/1
1 TABLET ORAL DAILY
Qty: 90 TABLET | Refills: 0 | Status: SHIPPED | OUTPATIENT
Start: 2023-08-15

## 2023-09-20 ENCOUNTER — APPOINTMENT (OUTPATIENT)
Dept: RADIOLOGY | Facility: MEDICAL CENTER | Age: 38
End: 2023-09-20
Payer: OTHER MISCELLANEOUS

## 2023-09-20 ENCOUNTER — OCCMED (OUTPATIENT)
Dept: URGENT CARE | Facility: MEDICAL CENTER | Age: 38
End: 2023-09-20
Payer: OTHER MISCELLANEOUS

## 2023-09-20 DIAGNOSIS — M77.12 LATERAL EPICONDYLITIS OF LEFT ELBOW: ICD-10-CM

## 2023-09-20 DIAGNOSIS — M77.12 LATERAL EPICONDYLITIS OF LEFT ELBOW: Primary | ICD-10-CM

## 2023-09-20 PROCEDURE — 99283 EMERGENCY DEPT VISIT LOW MDM: CPT | Performed by: ORTHOPAEDIC SURGERY

## 2023-09-20 PROCEDURE — 73080 X-RAY EXAM OF ELBOW: CPT

## 2023-09-20 PROCEDURE — G0382 LEV 3 HOSP TYPE B ED VISIT: HCPCS | Performed by: ORTHOPAEDIC SURGERY

## 2023-10-03 ENCOUNTER — OCCMED (OUTPATIENT)
Dept: URGENT CARE | Facility: MEDICAL CENTER | Age: 38
End: 2023-10-03

## 2023-10-03 DIAGNOSIS — S50.02XD CONTUSION OF LEFT ELBOW, SUBSEQUENT ENCOUNTER: Primary | ICD-10-CM

## 2023-10-03 DIAGNOSIS — M77.12 LATERAL EPICONDYLITIS OF LEFT ELBOW: ICD-10-CM

## 2023-10-12 ENCOUNTER — OCCMED (OUTPATIENT)
Dept: URGENT CARE | Facility: MEDICAL CENTER | Age: 38
End: 2023-10-12
Payer: OTHER MISCELLANEOUS

## 2023-10-12 DIAGNOSIS — M77.12 LATERAL EPICONDYLITIS OF LEFT ELBOW: ICD-10-CM

## 2023-10-12 DIAGNOSIS — S50.02XD CONTUSION OF LEFT ELBOW, SUBSEQUENT ENCOUNTER: Primary | ICD-10-CM

## 2023-10-12 PROCEDURE — 99213 OFFICE O/P EST LOW 20 MIN: CPT | Performed by: NURSE PRACTITIONER

## 2023-10-12 NOTE — PROGRESS NOTES
Pre-charting complete. Care gaps identified will be addressed at the time of visit.   This encounter was created for [facility] orders only .

## 2023-10-24 ENCOUNTER — OCCMED (OUTPATIENT)
Dept: URGENT CARE | Facility: MEDICAL CENTER | Age: 38
End: 2023-10-24
Payer: OTHER MISCELLANEOUS

## 2023-10-24 DIAGNOSIS — M77.12 LATERAL EPICONDYLITIS OF LEFT ELBOW: ICD-10-CM

## 2023-10-24 DIAGNOSIS — S50.02XD CONTUSION OF LEFT ELBOW, SUBSEQUENT ENCOUNTER: Primary | ICD-10-CM

## 2023-10-24 PROCEDURE — 99213 OFFICE O/P EST LOW 20 MIN: CPT | Performed by: NURSE PRACTITIONER

## 2023-11-08 ENCOUNTER — OFFICE VISIT (OUTPATIENT)
Dept: FAMILY MEDICINE CLINIC | Facility: CLINIC | Age: 38
End: 2023-11-08
Payer: COMMERCIAL

## 2023-11-08 VITALS
HEART RATE: 90 BPM | BODY MASS INDEX: 31.18 KG/M2 | OXYGEN SATURATION: 99 % | WEIGHT: 217.8 LBS | TEMPERATURE: 98.7 F | HEIGHT: 70 IN | SYSTOLIC BLOOD PRESSURE: 132 MMHG | DIASTOLIC BLOOD PRESSURE: 80 MMHG

## 2023-11-08 DIAGNOSIS — G89.29 CHRONIC LEFT-SIDED LOW BACK PAIN WITHOUT SCIATICA: Primary | ICD-10-CM

## 2023-11-08 DIAGNOSIS — M76.62 ACHILLES TENDINITIS OF LEFT LOWER EXTREMITY: ICD-10-CM

## 2023-11-08 DIAGNOSIS — Z23 ENCOUNTER FOR IMMUNIZATION: ICD-10-CM

## 2023-11-08 DIAGNOSIS — M54.50 CHRONIC LEFT-SIDED LOW BACK PAIN WITHOUT SCIATICA: Primary | ICD-10-CM

## 2023-11-08 PROCEDURE — 99213 OFFICE O/P EST LOW 20 MIN: CPT | Performed by: FAMILY MEDICINE

## 2023-11-08 PROCEDURE — 90686 IIV4 VACC NO PRSV 0.5 ML IM: CPT

## 2023-11-08 PROCEDURE — 90471 IMMUNIZATION ADMIN: CPT

## 2023-11-08 RX ORDER — DICLOFENAC SODIUM 75 MG/1
75 TABLET, DELAYED RELEASE ORAL 2 TIMES DAILY
Qty: 60 TABLET | Refills: 0 | Status: SHIPPED | OUTPATIENT
Start: 2023-11-08

## 2023-11-08 RX ORDER — BACLOFEN 10 MG/1
10 TABLET ORAL
Qty: 30 TABLET | Refills: 0 | Status: SHIPPED | OUTPATIENT
Start: 2023-11-08

## 2023-11-08 NOTE — PROGRESS NOTES
Name: Chema Casas      : 1985      MRN: [id number]  Encounter Provider: Richie Galeazzi, DO  Encounter Date: 2023   Encounter department: [address] [place] [address]     1. Chronic left-sided low back pain without sciatica  -     diclofenac (VOLTAREN) 75 mg EC tablet; Take 1 tablet (75 mg total) by mouth 2 (two) times a day  -     baclofen 10 mg tablet; Take 1 tablet (10 mg total) by mouth daily at bedtime    2. Achilles tendinitis of left lower extremity  -     Diclofenac Sodium (VOLTAREN) 1 %; Apply 2 g topically 4 (four) times a day  -     diclofenac (VOLTAREN) 75 mg EC tablet; Take 1 tablet (75 mg total) by mouth 2 (two) times a day    3. Encounter for immunization  -     influenza vaccine, quadrivalent, 0.5 mL, preservative-free, for adult and pediatric patients 6 mos+ (AFLURIA, FLUARIX, FLULAVAL, FLUZONE)    If back pain does not improve with medication, consider physical therapy. Depression Screening and Follow-up Plan: Patient was screened for depression during today's encounter. They screened negative with a PHQ-2 score of 0. Subjective      Patient was seen for chief complaint of left-sided low back pain which occasionally radiates to his left groin. No specific trauma. Symptoms started approximately 2 weeks ago. Also complains of pain in his left heel just adjacent to his Achilles tendon. Slight swelling. Also pain at the base of his left fifth toe. No trauma      Review of Systems   Constitutional: Negative. Respiratory: Negative. Cardiovascular: Negative. Gastrointestinal: Negative. Genitourinary: Negative. Musculoskeletal: Negative. Psychiatric/Behavioral: Negative.          Current Outpatient Medications on File Prior to Visit   Medication Sig   • lisinopril-hydrochlorothiazide (PRINZIDE,ZESTORETIC) 20-12.5 MG per tablet Take 1 tablet by mouth daily   • [DISCONTINUED] cyclobenzaprine (FLEXERIL) 10 mg tablet Take 1 tablet (10 mg total) by mouth daily at bedtime (Patient not taking: Reported on 11/8/2023)   • [DISCONTINUED] meloxicam (Mobic) 15 mg tablet Take 1 tablet (15 mg total) by mouth daily In am (Patient not taking: Reported on 11/8/2023)       Objective     /80 (BP Location: Right arm, Patient Position: Sitting, Cuff Size: Large)   Pulse 90   Temp 98.7 °F (37.1 °C)   Ht 5' 10" (1.778 m)   Wt 98.8 kg (217 lb 12.8 oz)   SpO2 99%   BMI 31.25 kg/m²     Physical Exam  Musculoskeletal:         General: Swelling (Swelling at insertion of left Achilles tendon) present.       Comments: Negative straight leg raising, normal DTRs, normal heel/toe gait       Janeal Score, DO

## 2023-11-13 ENCOUNTER — TELEPHONE (OUTPATIENT)
Age: 38
End: 2023-11-13

## 2023-11-13 NOTE — TELEPHONE ENCOUNTER
Caller: [facility]     Doctor: n/a    Reason for call: Calling to check status on plan of care , will fax over another plan of care     Call back#: n/a

## 2023-11-15 DIAGNOSIS — I10 ESSENTIAL HYPERTENSION: ICD-10-CM

## 2023-11-16 RX ORDER — LISINOPRIL AND HYDROCHLOROTHIAZIDE 20; 12.5 MG/1; MG/1
1 TABLET ORAL DAILY
Qty: 90 TABLET | Refills: 0 | Status: SHIPPED | OUTPATIENT
Start: 2023-11-16

## 2023-11-21 ENCOUNTER — OCCMED (OUTPATIENT)
Dept: URGENT CARE | Facility: MEDICAL CENTER | Age: 38
End: 2023-11-21
Payer: OTHER MISCELLANEOUS

## 2023-11-21 DIAGNOSIS — M77.12 LATERAL EPICONDYLITIS OF LEFT ELBOW: ICD-10-CM

## 2023-11-21 DIAGNOSIS — S50.02XA CONTUSION OF LEFT ELBOW, INITIAL ENCOUNTER: Primary | ICD-10-CM

## 2023-11-21 PROCEDURE — 99213 OFFICE O/P EST LOW 20 MIN: CPT | Performed by: NURSE PRACTITIONER

## 2023-12-07 ENCOUNTER — OCCMED (OUTPATIENT)
Dept: URGENT CARE | Facility: MEDICAL CENTER | Age: 38
End: 2023-12-07
Payer: OTHER MISCELLANEOUS

## 2023-12-07 DIAGNOSIS — M77.12 LEFT LATERAL EPICONDYLITIS: ICD-10-CM

## 2023-12-07 DIAGNOSIS — S50.02XD CONTUSION OF LEFT ELBOW, SUBSEQUENT ENCOUNTER: Primary | ICD-10-CM

## 2023-12-07 PROCEDURE — 99213 OFFICE O/P EST LOW 20 MIN: CPT | Performed by: NURSE PRACTITIONER

## 2023-12-14 ENCOUNTER — OFFICE VISIT (OUTPATIENT)
Dept: OBGYN CLINIC | Facility: MEDICAL CENTER | Age: 38
End: 2023-12-14
Payer: OTHER MISCELLANEOUS

## 2023-12-14 VITALS
BODY MASS INDEX: 31.78 KG/M2 | HEART RATE: 111 BPM | HEIGHT: 70 IN | WEIGHT: 222 LBS | SYSTOLIC BLOOD PRESSURE: 136 MMHG | DIASTOLIC BLOOD PRESSURE: 72 MMHG

## 2023-12-14 DIAGNOSIS — M77.12 LATERAL EPICONDYLITIS OF LEFT ELBOW: Primary | ICD-10-CM

## 2023-12-14 PROCEDURE — 99204 OFFICE O/P NEW MOD 45 MIN: CPT | Performed by: ORTHOPAEDIC SURGERY

## 2023-12-14 NOTE — PROGRESS NOTES
The HAND & UPPER EXTREMITY OFFICE VISIT   Referred By:  Cris Leonelar   [address]  [address] [facility],  [facility] [address]      Chief Complaint:     Left elbow pain    History of Present Illness:   43 y.o., Right hand dominant male seen in consultation requested by Dr. Dano Lr presents with left elbow pain x3 months. He reports working when he was trying to tighten a bolt and his hand slipped, causing him to hit his left elbow on a metal pole. Since then he is having a lot of pain in the left elbow. He has been attending PT which helps with the pain, but he notices the pain flares back up after working. 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 He notes the pain is worse with wrist extension. Denies numbness/tingling. He does note some warmth over the area as well. Has not trialed bracing, medications or injections.       ADLs: Community ambulator  Smoke: denies ETOH: socially   Drugs:  denies Job: employed, maintenance       Past Medical History:  Past Medical History:   Diagnosis Date    GERD (gastroesophageal reflux disease)     Hypertension     Lateral epicondylitis      Past Surgical History:   Procedure Laterality Date    APPENDECTOMY      LATERAL EPICONDYLE RELEASE Right 6/27/2017    Procedure: ELBOW LATERAL EPICONDYLAR RELEASE;  Surgeon: Danni Whittaker MD;  Location[facility];  Service: Orthopedics     Family History   Problem Relation Age of Onset    Colon cancer Mother     Hypertension Father      Social History     Socioeconomic History    Marital status: /Civil Union     Spouse name: Not on file    Number of children: Not on file    Years of education: Not on file    Highest education level: Not on file   Occupational History    Not on file   Tobacco Use    Smoking status: Never    Smokeless tobacco: Never   Vaping Use    Vaping status: Never Used   Substance and Sexual Activity    Alcohol use: Yes     Comment: Social    Drug use: No    Sexual activity: Not on file   Other Topics Concern    Not on file   Social History Narrative    Not on file     Social Determinants of Health     Financial Resource Strain: Not on file   Food Insecurity: Not on file   Transportation Needs: Not on file   Physical Activity: Not on file   Stress: Not on file   Social Connections: Not on file   Intimate Partner Violence: Not on file   Housing Stability: Not on file     Scheduled Meds:  Continuous Infusions:No current facility-administered medications for this visit. PRN Meds:. No Known Allergies        Physical Examination:    /72   Pulse (!) 111   Ht 5' 10" (1.778 m)   Wt 101 kg (222 lb)   BMI 31.85 kg/m²     Gen: A&Ox3, NAD  Cardiac: regular rate  Chest: non labored breathing  Abdomen: Non-distended        Left Upper Extremity:  Skin CDI  No obvious deformity of the shoulder, arm, elbow, forearm, wrist, hand  Negative swelling  TTP over lateral epicondyle  Non-tender medial epicondyle, or radial tunnel  Sensation intact to light touch in the axillary median, ulnar, and radial nerve distributions  Pain with resisted wrist extension with the elbow extended  2+RP      Studies:  Radiographs: I personally reviewed and independently interpreted the available radiographs.  9/20/23: Radiographs of the left elbow, multiple views, demonstrate no acute fracture or dislocation. There is a osteophyte formation on the medial coronoid. 11/17/23: MRI of the left elbow wo contrast demonstrates inflammation and degenerative appearing partial tearing of the common extensor tendon attaching to the lateral epicondyle. Lateral ulnar collateral ligament appears intact as well as the remainder of the lateral collateral ligament complex. Ulnar collateral ligament intact. Assessment and Plan:  1. Lateral epicondylitis of left elbow            43 y.o. male presents with signs and symptoms consistent with the above diagnosis.   We discussed the natural history of this condition and its pathogenesis. We discussed operative and nonoperative treatment options. He should continue to attend PT. We discussed that this may take up to a year to improve, but PT will offer him the best improvement in his symptoms. Offered cock up wrist brace to prevent wrist extension while working. Patient reports he would not be able to work with a wrist brace. Provided tennis elbow strap today. We reviewed management of his prescription pain medications. He was previously prescribed diclofenac tablets for back pain which he reports taking as needed. Patient reports he will continue to take the diclofenac as needed for back and elbow pain. It is recommended he return to the office in 3 months, or sooner should symptoms worsen    he expressed understanding of the plan and agreed. We encouraged them to contact our office with any questions or concerns. Deacon Madrid MD  Hand and Upper Extremity Surgery        *This note was dictated using Dragon voice recognition software. Please excuse any word substitutions or errors. *

## 2023-12-18 ENCOUNTER — TELEPHONE (OUTPATIENT)
Age: 38
End: 2023-12-18

## 2023-12-31 DIAGNOSIS — I10 ESSENTIAL HYPERTENSION: ICD-10-CM

## 2024-01-02 RX ORDER — LISINOPRIL AND HYDROCHLOROTHIAZIDE 20; 12.5 MG/1; MG/1
1 TABLET ORAL DAILY
Qty: 90 TABLET | Refills: 0 | Status: SHIPPED | OUTPATIENT
Start: 2024-01-02

## 2024-01-11 ENCOUNTER — OCCMED (OUTPATIENT)
Dept: URGENT CARE | Facility: MEDICAL CENTER | Age: 39
End: 2024-01-11
Payer: OTHER MISCELLANEOUS

## 2024-01-11 DIAGNOSIS — M77.12 LATERAL EPICONDYLITIS OF LEFT ELBOW: ICD-10-CM

## 2024-01-11 DIAGNOSIS — S50.02XD CONTUSION OF LEFT ELBOW, SUBSEQUENT ENCOUNTER: Primary | ICD-10-CM

## 2024-01-11 PROCEDURE — 99213 OFFICE O/P EST LOW 20 MIN: CPT | Performed by: NURSE PRACTITIONER

## 2024-01-17 ENCOUNTER — TELEPHONE (OUTPATIENT)
Dept: OBGYN CLINIC | Facility: HOSPITAL | Age: 39
End: 2024-01-17

## 2024-01-17 NOTE — TELEPHONE ENCOUNTER
Caller: [facility]    Doctor: Dwayne    Reason for call: Rehab faxed over a plan of care to be signed on 01/09/2024 and this past Monday, nothing in the system. I was able to get an alternative fax number for them which was given.    They will be faxing plan of care to be signed  to [phone]    Call back#: [phone]

## 2024-01-30 DIAGNOSIS — I10 ESSENTIAL HYPERTENSION: ICD-10-CM

## 2024-01-30 RX ORDER — LISINOPRIL AND HYDROCHLOROTHIAZIDE 20; 12.5 MG/1; MG/1
1 TABLET ORAL DAILY
Qty: 90 TABLET | Refills: 0 | Status: SHIPPED | OUTPATIENT
Start: 2024-01-30

## 2024-02-15 ENCOUNTER — APPOINTMENT (OUTPATIENT)
Dept: URGENT CARE | Facility: MEDICAL CENTER | Age: 39
End: 2024-02-15
Payer: OTHER MISCELLANEOUS

## 2024-02-15 PROCEDURE — 99213 OFFICE O/P EST LOW 20 MIN: CPT | Performed by: NURSE PRACTITIONER

## 2024-02-21 PROBLEM — J06.9 VIRAL URI: Status: RESOLVED | Noted: 2020-11-12 | Resolved: 2024-02-21

## 2024-02-22 ENCOUNTER — TELEPHONE (OUTPATIENT)
Dept: CARDIOLOGY CLINIC | Facility: CLINIC | Age: 39
End: 2024-02-22

## 2024-03-06 ENCOUNTER — OFFICE VISIT (OUTPATIENT)
Dept: CARDIOLOGY CLINIC | Facility: CLINIC | Age: 39
End: 2024-03-06
Payer: COMMERCIAL

## 2024-03-06 VITALS
OXYGEN SATURATION: 97 % | DIASTOLIC BLOOD PRESSURE: 67 MMHG | SYSTOLIC BLOOD PRESSURE: 128 MMHG | HEIGHT: 70 IN | BODY MASS INDEX: 31.35 KG/M2 | HEART RATE: 82 BPM | WEIGHT: 219 LBS

## 2024-03-06 DIAGNOSIS — I10 ESSENTIAL HYPERTENSION: Primary | ICD-10-CM

## 2024-03-06 DIAGNOSIS — I49.3 PVC (PREMATURE VENTRICULAR CONTRACTION): ICD-10-CM

## 2024-03-06 DIAGNOSIS — R00.2 PALPITATIONS: ICD-10-CM

## 2024-03-06 PROCEDURE — 99214 OFFICE O/P EST MOD 30 MIN: CPT | Performed by: INTERNAL MEDICINE

## 2024-03-06 PROCEDURE — 93000 ELECTROCARDIOGRAM COMPLETE: CPT | Performed by: INTERNAL MEDICINE

## 2024-03-06 NOTE — PROGRESS NOTES
Cardiology Follow Up    Roberto Mcguire  [id number]  1985  [facility] [place] [address] [facility] [address] [phone]      1. Essential hypertension  POCT ECG      2. Palpitations  POCT ECG    Holter monitor    Echo complete w/ contrast if indicated      3. PVC (premature ventricular contraction)  Holter monitor    Echo complete w/ contrast if indicated          Discussion/Summary:    Hypertension: Controlled with losartan/HCTZ.    Palpitations: Previously noted to have PVCs on Holter monitor. This was a few years ago. They continue to occur, and they are somewhat bothersome to him. Repeat Holter monitor and echocardiogram. If he is has a high burden, can change his antihypertensive to include a beta-blocker. Otherwise, can continue with conservative management.    Still unclear if he will need any orthopedic surgical procedure for his current injury. If he does, there is no cardiac contraindication to proceeding and he is low risk.    Previous History:   Hypertension. Palpitations, has had PVCs on Holter monitor previously.    Returns for follow-up. Continues to do generally well. His blood pressure is controlled. He feels occasional palpitations, still. They are somewhat bothersome to him. They can come every few days or sometimes every day. No obvious triggers, as before. He has previously had a Holter monitor done in 2021 which had PVCs present. Has had a stress echo in 2019, but no recent repeat.    He had an orthopedic injury with lateral epicondylitis. He is seeing orthopedic surgery. He has been doing physical therapy for several months but does not have major improvement just yet. He is going to be following up on March 19 and he is unsure if he will need a surgical procedure. He is hopeful not to          Patient Active Problem List   Diagnosis    Lateral epicondylitis    Iliotibial band syndrome    Knee joint effusion  "   Obstructive sleep apnea    Exposure to COVID-19 virus    Essential hypertension    Weight loss    Elevated blood sugar    Fatigue     Past Medical History:   Diagnosis Date    GERD (gastroesophageal reflux disease)     Hypertension     Lateral epicondylitis      Social History     Tobacco Use    Smoking status: Never    Smokeless tobacco: Never   Vaping Use    Vaping status: Never Used   Substance Use Topics    Alcohol use: Yes     Comment: Social    Drug use: No      Family History   Problem Relation Age of Onset    Colon cancer Mother     Hypertension Father      Past Surgical History:   Procedure Laterality Date    APPENDECTOMY      LATERAL EPICONDYLE RELEASE Right 6/27/2017    Procedure: ELBOW LATERAL EPICONDYLAR RELEASE;  Surgeon: Star Sharif MD;  [facility]: [facility];  Service: Orthopedics       Current Outpatient Medications:     baclofen 10 mg tablet, Take 1 tablet (10 mg total) by mouth daily at bedtime, Disp: 30 tablet, Rfl: 0    diclofenac (VOLTAREN) 75 mg EC tablet, Take 1 tablet (75 mg total) by mouth 2 (two) times a day, Disp: 60 tablet, Rfl: 0    lisinopril-hydrochlorothiazide (PRINZIDE,ZESTORETIC) 20-12.5 MG per tablet, Take 1 tablet by mouth daily, Disp: 90 tablet, Rfl: 0    Diclofenac Sodium (VOLTAREN) 1 %, Apply 2 g topically 4 (four) times a day (Patient not taking: Reported on 12/14/2023), Disp: 100 g, Rfl: 1  No Known Allergies    Vitals:    03/06/24 1444   BP: 128/67   BP Location: Left arm   Patient Position: Sitting   Cuff Size: Adult   Pulse: 82   SpO2: 97%   Weight: 99.3 kg (219 lb)   Height: 5' 10\" (1.778 m)     Vitals:    03/06/24 1444   Weight: 99.3 kg (219 lb)      Height: 5' 10\" (177.8 cm)   Body mass index is 31.42 kg/m².    Physical Exam:  GEN: Roberto Mcguire appears well, alert and oriented x 3, pleasant and cooperative   HEENT: pupils equal, round, and reactive to light; extraocular muscles intact  NECK: supple, no carotid bruits   HEART: regular rhythm, normal " S1 and S2, no murmurs, clicks, gallops or rubs   LUNGS: clear to auscultation bilaterally; no wheezes, rales, or rhonchi   ABDOMEN: normal bowel sounds, soft, no tenderness, no distention  EXTREMITIES: peripheral pulses normal; no clubbing, cyanosis, or edema  NEURO: no focal findings   SKIN: normal without suspicious lesions on exposed skin    ROS:  Positive for  Snoring  Except as noted in HPI, is otherwise reviewed in detail and a 12 point review of systems is negative.  ROS reviewed and is unchanged    Labs:  Lab Results   Component Value Date    K 3.8 01/18/2023     01/18/2023    CREATININE 0.94 01/18/2023    BUN 14 01/18/2023    CO2 29 01/18/2023    ALT 52 01/18/2023    AST 30 01/18/2023    GLUF 76 01/18/2023    HGBA1C 5.1 10/11/2021    WBC 6.13 01/18/2023    HGB 15.1 01/18/2023    HCT 43.9 01/18/2023     01/18/2023       Lab Results   Component Value Date    CHOL 145 11/18/2013     Lab Results   Component Value Date    HDL 37 (L) 01/18/2023    HDL 36 (L) 10/11/2021    HDL 33 (L) 08/31/2019     Lab Results   Component Value Date    LDLCALC 101 (H) 01/18/2023    LDLCALC 106 (H) 10/11/2021    LDLCALC 89 08/31/2019     Lab Results   Component Value Date    TRIG 67 01/18/2023    TRIG 67 10/11/2021    TRIG 123 08/31/2019     Testing:  Holter 2/2021  CONCLUSIONS:  Holter monitor reveals the underlying rhythm is sinus rhythm with an average heart rate of 82 beats per minute, a minimum heart rate of 54 beats per minute and a maximum heart rate of 140 beats per minute.  There were about 3812 ventricular ectopic beats, mostly occurring as single PVC's with no evidence of ventricular tachycardia.  There were about 9 supraventricular ectopic beats, mostly occurring as single PAC's and late beats with no evidence of supraventricular tachycardia, atrial fibrillation, or atrial flutter.  There is no evidence of significant bradyarrhythmia or advanced heart block.  The longest R to R was 1.4 seconds.  The  patient's symptom diary reported shortness of breath and palpitations. This was associated with sinus rhythm with rates 80s-100 bpm and a single symptomatic PVC.    Stress echo   10/22/19:  IMPRESSIONS:  1. Good exercise tolerance  2. Normal blood pressure response to exercise  3. Negative graded treadmill stress test for symptoms of angina pectoris or electrocardiographic changes of ischemia  4. Normal resting left ventricular function  5. Normal left ventricular systolic function response to exercise  6. Negative stress echocardiogram for evidence of a prior myocardial infarction or exercise induced myocardial ischemia.    EKG:   Sinus rhythm. 82 bpm. Normal EKG.

## 2024-03-19 ENCOUNTER — OFFICE VISIT (OUTPATIENT)
Dept: OBGYN CLINIC | Facility: MEDICAL CENTER | Age: 39
End: 2024-03-19
Payer: OTHER MISCELLANEOUS

## 2024-03-19 VITALS
HEIGHT: 70 IN | WEIGHT: 223.8 LBS | DIASTOLIC BLOOD PRESSURE: 78 MMHG | BODY MASS INDEX: 32.04 KG/M2 | SYSTOLIC BLOOD PRESSURE: 128 MMHG | HEART RATE: 90 BPM

## 2024-03-19 DIAGNOSIS — M77.02 MEDIAL EPICONDYLITIS OF LEFT ELBOW: ICD-10-CM

## 2024-03-19 DIAGNOSIS — M77.12 LATERAL EPICONDYLITIS OF LEFT ELBOW: Primary | ICD-10-CM

## 2024-03-19 DIAGNOSIS — G56.22 ULNAR NEURITIS, LEFT: ICD-10-CM

## 2024-03-19 DIAGNOSIS — Z01.89 ENCOUNTER FOR UPPER EXTREMITY COMPARISON IMAGING STUDY: ICD-10-CM

## 2024-03-19 PROCEDURE — 20551 NJX 1 TENDON ORIGIN/INSJ: CPT | Performed by: ORTHOPAEDIC SURGERY

## 2024-03-19 PROCEDURE — 99213 OFFICE O/P EST LOW 20 MIN: CPT | Performed by: ORTHOPAEDIC SURGERY

## 2024-03-19 RX ORDER — LIDOCAINE HYDROCHLORIDE 10 MG/ML
1 INJECTION, SOLUTION INFILTRATION; PERINEURAL
Status: COMPLETED | OUTPATIENT
Start: 2024-03-19 | End: 2024-03-19

## 2024-03-19 RX ORDER — BETAMETHASONE SODIUM PHOSPHATE AND BETAMETHASONE ACETATE 3; 3 MG/ML; MG/ML
6 INJECTION, SUSPENSION INTRA-ARTICULAR; INTRALESIONAL; INTRAMUSCULAR; SOFT TISSUE
Status: COMPLETED | OUTPATIENT
Start: 2024-03-19 | End: 2024-03-19

## 2024-03-19 RX ADMIN — BETAMETHASONE SODIUM PHOSPHATE AND BETAMETHASONE ACETATE 6 MG: 3; 3 INJECTION, SUSPENSION INTRA-ARTICULAR; INTRALESIONAL; INTRAMUSCULAR; SOFT TISSUE at 11:15

## 2024-03-19 RX ADMIN — LIDOCAINE HYDROCHLORIDE 1 ML: 10 INJECTION, SOLUTION INFILTRATION; PERINEURAL at 11:15

## 2024-03-19 NOTE — PROGRESS NOTES
Hand/upper extremity injection: L elbow  Universal Protocol:  Consent: Verbal consent obtained.  Risks and benefits: risks, benefits and alternatives were discussed  Consent given by: patient  Patient identity confirmed: verbally with patient  Supporting Documentation  Indications: pain   Procedure Details  Condition:lateral epicondylitis Site: L elbow   Needle size: 25 G  Ultrasound guidance: no  Approach: lateral  Medications administered: 1 mL lidocaine 1 %; 6 mg betamethasone acetate-betamethasone sodium phosphate 6 (3-3) mg/mL  Patient tolerance: patient tolerated the procedure well with no immediate complications

## 2024-03-19 NOTE — PROGRESS NOTES
HAND & UPPER EXTREMITY OFFICE VISIT   Referred By:  No referring provider defined for this encounter.      Chief Complaint:     Left elbow pain  DOI 6 months    Previous History:   Previously seen on 12/14/2023. At that point he has been treating conservatively for left lateral epicondylitis. He reports working when he was trying to tighten a bolt and his hand slipped, causing him to hit his left elbow on a metal pole.  Since then he is having a lot of pain in the left elbow.  He has been attending PT which helps with the pain, but he notices the pain flares back up after working. 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 He notes the pain is worse with wrist extension.     Interval History:  Since the last visit he has bene treating with a tennis elbow strap at work and physical therapy at [facility]. He is also taking oral Diclofenac.  Patient reports he is now having medial elbow pain with a new onset of numbness and tingling into the 2 ulnar digits.  He reports that this started approximately 1-1/2 to 2 months ago.  He is working on a lifting restriction of an accepting 30 pounds.    Past Medical History:  Past Medical History:   Diagnosis Date    GERD (gastroesophageal reflux disease)     Hypertension     Lateral epicondylitis      Past Surgical History:   Procedure Laterality Date    APPENDECTOMY      LATERAL EPICONDYLE RELEASE Right 6/27/2017    Procedure: ELBOW LATERAL EPICONDYLAR RELEASE;  Surgeon: Star Sharif MD;  [facility];  Service: Orthopedics     Family History   Problem Relation Age of Onset    Colon cancer Mother     Hypertension Father      Social History     Socioeconomic History    Marital status: /Civil Union     Spouse name: Not on file    Number of children: Not on file    Years of education: Not on file    Highest education level: Not on file   Occupational History    Not on file   Tobacco Use    Smoking status: Never    Smokeless tobacco:  "Never   Vaping Use    Vaping status: Never Used   Substance and Sexual Activity    Alcohol use: Yes     Comment: Social    Drug use: No    Sexual activity: Not on file   Other Topics Concern    Not on file   Social History Narrative    Not on file     Social Determinants of Health     Financial Resource Strain: Not on file   Food Insecurity: Not on file   Transportation Needs: Not on file   Physical Activity: Not on file   Stress: Not on file   Social Connections: Not on file   Intimate Partner Violence: Not on file   Housing Stability: Not on file     Scheduled Meds:  Continuous Infusions:No current facility-administered medications for this visit.    PRN Meds:.  No Known Allergies    Physical Examination:    /78   Pulse 90   Ht 5' 10\" (1.778 m)   Wt 102 kg (223 lb 12.8 oz)   BMI 32.11 kg/m²     Gen: A&Ox3, NAD  Cardiac: regular rate  Chest: non labored breathing  Abdomen: Non-distended    Cervcial Spine: Negative Spurling's      Left Upper Extremity:  Skin CDI  No obvious deformity of the shoulder, arm, elbow, forearm, wrist, hand  Swelling Negative  TTP at the lateral epicondyle, medial epicondyle, medial triceps head  Pain with resisted wrist extension, flexion   + tinel's at the elbow, +elbow flexion compression test  Sensation intact to light touch in the axillary median, ulnar, and radial nerve distributions  4/5 FDP small finger  4/5 interosseous  5/5 thumb opposition    Negative cross over  2+RP    2 point: thumb 5mm   Index 5mm  Long 5mm  Ring 5mm  Small 7mm            Studies:  Radiographs: no imaging reviewed at today's visit    Assessment and Plan:  1. Lateral epicondylitis of left elbow        2. Ulnar neuritis, left  US MSK limited      3. Encounter for upper extremity comparison imaging study  US MSK limited      4. Medial epicondylitis of left elbow            39 y.o. male presents in follow up for the above diagnosis. An US of the elbows were ordered to further evaluate for left ulnar " neuritis vs cubital tunnel and compared to contralateral side.    For his continued lateral epicondylitis pain he is now 6 months out from the start of symptoms without any improvement. As an alternative treatment A cortisone injection was discussed.  We discussed that the evidence behind steroid injections for tennis elbow is limited and it may not be significantly better compared to placebo.  At the very best I think this will likely give him temporary relief but he is desperate at this point with no improvement in symptoms after 6 months.  He would like to proceed with a corticosteroid injection.  Risks, benefits and alternative treatments were discussed with the patient. The risks of injection include but are not limited to: bleeding, infection, damage to nerves, vessels or tendons, allergic reaction to agents, possible increase in pain, tendon or ligament rupture, weakening of bone or soft tissues, and/or elevation in blood sugar. Patient understands and would like to proceed with the proposed procedure. Injection was tolerated well. Please see procedure note for details. May take NSAIDs/Tylenol or apply ice to the area as needed for post-injection discomfort.    Patient wished to proceed with the injection, which he tolerated well. Encouraged the patient to continue his PT/OT with the tennis elbow strap.  Continue work restrictions of max lifting 30lbs placed by [employer].    It is recommended he return to the office  After the US of the Left elbow    Procedure: Corticosteroid Injection  Tennis Elbow     The nature of and the indications for a corticosteroid and/or local anaesthetic injection were reviewed in detail with the patient today.  The inherent risks of injection including infection, allergic reaction, increased pain, incomplete relief or temporary relief of symptoms, alterations of blood glucose levels requiring careful monitoring and treatment as indicated, tendon, ligament or articular cartilage  rupture or degeneration, were discussed.  The patient elected to proceed with the injection.  The injection was performed under sterile conditions after informed consent was obtained from the patient and the correct injection site was confirmed with the patient.      Left upper extremity was injected at the lateral epicondyle with 1 cc of a 1:1 solution of betamethasone and 1% Xylocaine without epinepherine. A sterile band-aide was applied.  The patient tolerated the injection well and was discharged without complication.  Post-injection protocol for activity modification, anti-inflammatory measures, and follow-up was reviewed with the patient.    he expressed understanding of the plan and agreed. We encouraged them to contact our office with any questions or concerns.       Quintin Rice MD  Hand and Upper Extremity Surgery      *This note was dictated using Dragon voice recognition software. Please excuse any word substitutions or errors.*

## 2024-03-19 NOTE — PATIENT INSTRUCTIONS
Risks, benefits and alternative treatments were discussed with the patient. The risks of injection include but are not limited to: bleeding, infection, damage to nerves, vessels or tendons, allergic reaction to agents, possible increase in pain, tendon or ligament rupture, weakening of bone or soft tissues, and/or elevation in blood sugar. Patient understands and would like to proceed with the proposed procedure. Injection was tolerated well. Please see procedure note for details. May take NSAIDs/Tylenol or apply ice to the area as needed for post-injection discomfort.

## 2024-03-20 DIAGNOSIS — I10 ESSENTIAL HYPERTENSION: ICD-10-CM

## 2024-03-21 ENCOUNTER — APPOINTMENT (OUTPATIENT)
Dept: URGENT CARE | Facility: MEDICAL CENTER | Age: 39
End: 2024-03-21
Payer: OTHER MISCELLANEOUS

## 2024-03-21 PROCEDURE — 99213 OFFICE O/P EST LOW 20 MIN: CPT | Performed by: NURSE PRACTITIONER

## 2024-03-21 RX ORDER — LISINOPRIL AND HYDROCHLOROTHIAZIDE 20; 12.5 MG/1; MG/1
1 TABLET ORAL DAILY
Qty: 90 TABLET | Refills: 0 | Status: SHIPPED | OUTPATIENT
Start: 2024-03-21

## 2024-03-22 ENCOUNTER — TELEPHONE (OUTPATIENT)
Dept: OBGYN CLINIC | Facility: HOSPITAL | Age: 39
End: 2024-03-22

## 2024-03-22 NOTE — TELEPHONE ENCOUNTER
Caller: Frnak [facility]    Doctor: Dwayne    Reason for call: Looking to see what patients physical therapy plans are as of this point?  Is patient to continue physical therapy or stop?    Sindi was going to discharge him, because he has been doing well.    Call back#: [phone]

## 2024-03-22 NOTE — TELEPHONE ENCOUNTER
Talked to PT and they are wondering if there are other things they should do with the pt. He is complaning of more pain. Told the therapist I would message PA and  to see what they wanted to do. He has kind of hit a plateau at this point and is still complaining of pain.

## 2024-04-02 ENCOUNTER — HOSPITAL ENCOUNTER (OUTPATIENT)
Dept: NON INVASIVE DIAGNOSTICS | Facility: HOSPITAL | Age: 39
Discharge: HOME | End: 2024-04-02
Payer: COMMERCIAL

## 2024-04-02 ENCOUNTER — HOSPITAL ENCOUNTER (OUTPATIENT)
Dept: NON INVASIVE DIAGNOSTICS | Facility: HOSPITAL | Age: 39
End: 2024-04-02
Payer: COMMERCIAL

## 2024-04-02 DIAGNOSIS — R00.2 PALPITATIONS: ICD-10-CM

## 2024-04-02 DIAGNOSIS — I49.3 PVC (PREMATURE VENTRICULAR CONTRACTION): ICD-10-CM

## 2024-04-02 PROCEDURE — 93226 XTRNL ECG REC<48 HR SCAN A/R: CPT

## 2024-04-02 PROCEDURE — 93225 XTRNL ECG REC<48 HRS REC: CPT

## 2024-04-17 ENCOUNTER — HOSPITAL ENCOUNTER (OUTPATIENT)
Dept: ULTRASOUND IMAGING | Facility: HOSPITAL | Age: 39
Discharge: HOME | End: 2024-04-17
Attending: ORTHOPAEDIC SURGERY
Payer: OTHER MISCELLANEOUS

## 2024-04-17 DIAGNOSIS — G56.22 ULNAR NEURITIS, LEFT: ICD-10-CM

## 2024-04-17 DIAGNOSIS — Z01.89 ENCOUNTER FOR UPPER EXTREMITY COMPARISON IMAGING STUDY: ICD-10-CM

## 2024-04-17 PROCEDURE — 76882 US LMTD JT/FCL EVL NVASC XTR: CPT

## 2024-04-21 DIAGNOSIS — I10 ESSENTIAL HYPERTENSION: ICD-10-CM

## 2024-04-22 RX ORDER — LISINOPRIL AND HYDROCHLOROTHIAZIDE 20; 12.5 MG/1; MG/1
1 TABLET ORAL DAILY
Qty: 30 TABLET | Refills: 0 | Status: SHIPPED | OUTPATIENT
Start: 2024-04-22

## 2024-04-24 ENCOUNTER — OFFICE VISIT (OUTPATIENT)
Dept: OBGYN CLINIC | Facility: MEDICAL CENTER | Age: 39
End: 2024-04-24
Payer: OTHER MISCELLANEOUS

## 2024-04-24 VITALS
SYSTOLIC BLOOD PRESSURE: 119 MMHG | HEIGHT: 70 IN | WEIGHT: 224 LBS | HEART RATE: 96 BPM | DIASTOLIC BLOOD PRESSURE: 70 MMHG | BODY MASS INDEX: 32.07 KG/M2

## 2024-04-24 DIAGNOSIS — M77.02 MEDIAL EPICONDYLITIS OF LEFT ELBOW: ICD-10-CM

## 2024-04-24 DIAGNOSIS — M77.12 LATERAL EPICONDYLITIS OF LEFT ELBOW: Primary | ICD-10-CM

## 2024-04-24 PROCEDURE — 99213 OFFICE O/P EST LOW 20 MIN: CPT | Performed by: ORTHOPAEDIC SURGERY

## 2024-04-24 NOTE — PROGRESS NOTES
HAND & UPPER EXTREMITY OFFICE VISIT   Referred By:  Referral Self  No address on file      Chief Complaint:     Left elbow pain  DOI: approx September 2023    Previous History:   Previously seen on 3/19/24. At that point he has been wearing a tennis elbow strap and attending to PT. He also reported new onset medial elbow pain and numbness and tingling in the ulnar 2 digits. MSK US was ordered to evaluate for cubital tunnel syndrome. CSI was administered at the last visit for lateral epicondylitis.    Interval History:  Since the last visit he reports continued pain in the elbow. He reports the injection helped a little but he is still having pain at the medial and lateral epicondyles. He recently completed his US for the elbows and presents today to review his results.     Past Medical History:  Past Medical History:   Diagnosis Date    GERD (gastroesophageal reflux disease)     Hypertension     Lateral epicondylitis      Past Surgical History:   Procedure Laterality Date    APPENDECTOMY      LATERAL EPICONDYLE RELEASE Right 6/27/2017    Procedure: ELBOW LATERAL EPICONDYLAR RELEASE;  Surgeon: Star Sharif MD;  [facility];  Service: Orthopedics     Family History   Problem Relation Age of Onset    Colon cancer Mother     Hypertension Father      Social History     Socioeconomic History    Marital status: /Civil Union     Spouse name: Not on file    Number of children: Not on file    Years of education: Not on file    Highest education level: Not on file   Occupational History    Not on file   Tobacco Use    Smoking status: Never    Smokeless tobacco: Never   Vaping Use    Vaping status: Never Used   Substance and Sexual Activity    Alcohol use: Yes     Comment: Social    Drug use: No    Sexual activity: Not on file   Other Topics Concern    Not on file   Social History Narrative    Not on file     Social Determinants of Health     Financial Resource Strain: Not on file   Food Insecurity: Not  "on file   Transportation Needs: Not on file   Physical Activity: Not on file   Stress: Not on file   Social Connections: Not on file   Intimate Partner Violence: Not on file   Housing Stability: Not on file     Scheduled Meds:  Continuous Infusions:No current facility-administered medications for this visit.    PRN Meds:.  No Known Allergies    Physical Examination:    /70   Pulse 96   Ht 5' 10\" (1.778 m)   Wt 102 kg (224 lb)   BMI 32.14 kg/m²     Gen: A&Ox3, NAD  Cardiac: regular rate  Chest: non labored breathing  Abdomen: Non-distended    Cervcial Spine: Negative Spurling's    Left Upper Extremity:  Skin CDI  No obvious deformity of the shoulder, arm, elbow, forearm, wrist, hand  Swelling Negative  TTP at the lateral epicondyle, medial epicondyle  Sensation intact to light touch in the axillary median, ulnar, and radial nerve distributions  4/5 FDP small finger  4/5 interosseous  5/5 thumb opposition    Negative cross over  2+RP       Studies:  4/17/24: MSK US left elbow demonstrates maximum ulnar nerve cross sectional area within or near the cubital tunnel: 6 sq mm. Maximal ulnar nerve/proximal cross sectional ratio: One-point. No evidence of ulnar nerve dislocation from the cubital tunnel on dynamic flexion-extension evaluation.    Right elbow: Maximum ulnar nerve cross sectional area within or near the cubital tunnel: 6.3 sq mm. Maximal ulnar nerve/proximal cross sectional ratio: 1.6. There is evidence of ulnar nerve dislocation from the cubital tunnel on dynamic flexion-extension evaluation.    Assessment and Plan:  1. Lateral epicondylitis of left elbow        2. Medial epicondylitis of left elbow              39 y.o. male presents in follow up for the above diagnosis. US results reviewed which do not demonstrate any evidence of cubital tunnel syndrome but he may still be having ulnar neuritis related to the inflammation from his medial and lateral epicondylitis. We discussed management options for " the medial and lateral epicondylitis including CSI of the medial epicondyle, PRP, Tenex, or surgery. At this point he would like to think about his options regarding further intervention and will contact the office if he would like to proceed. Otherwise he can continue to wear the tennis elbow strap and participate in HEP as tolerated.     he expressed understanding of the plan and agreed. We encouraged them to contact our office with any questions or concerns.       Quintin Rice MD  Hand and Upper Extremity Surgery      *This note was dictated using Dragon voice recognition software. Please excuse any word substitutions or errors.*

## 2024-05-30 DIAGNOSIS — I10 ESSENTIAL HYPERTENSION: ICD-10-CM

## 2024-05-31 RX ORDER — LISINOPRIL AND HYDROCHLOROTHIAZIDE 20; 12.5 MG/1; MG/1
1 TABLET ORAL DAILY
Qty: 90 TABLET | Refills: 1 | Status: SHIPPED | OUTPATIENT
Start: 2024-05-31

## 2024-06-07 ENCOUNTER — OFFICE VISIT (OUTPATIENT)
Dept: OBGYN CLINIC | Facility: CLINIC | Age: 39
End: 2024-06-07
Payer: COMMERCIAL

## 2024-06-07 VITALS
BODY MASS INDEX: 32.07 KG/M2 | DIASTOLIC BLOOD PRESSURE: 70 MMHG | HEIGHT: 70 IN | SYSTOLIC BLOOD PRESSURE: 116 MMHG | WEIGHT: 224 LBS

## 2024-06-07 DIAGNOSIS — M25.859 FEMORAL ACETABULAR IMPINGEMENT: ICD-10-CM

## 2024-06-07 DIAGNOSIS — M76.892 HIP FLEXOR TENDINITIS, LEFT: Primary | ICD-10-CM

## 2024-06-07 PROCEDURE — 99214 OFFICE O/P EST MOD 30 MIN: CPT | Performed by: PHYSICIAN ASSISTANT

## 2024-06-07 RX ORDER — MELOXICAM 15 MG/1
15 TABLET ORAL DAILY
Qty: 30 TABLET | Refills: 0 | Status: SHIPPED | OUTPATIENT
Start: 2024-06-07

## 2024-06-07 NOTE — PROGRESS NOTES
"Patient Name:  Roberto Mcguire  MRN:  [id number]    Assessment & Plan     Left hip pain, likely LEEROY versus hip flexor tendinitis.  Referral to physical therapy.  Prescription for meloxicam.  Activities as tolerated modification avoid pain.  Work note provided today to continue light duty restrictions.  Follow-up in 6 weeks.  Consider advanced imaging at that time as appropriate.    Chief Complaint     Left hip pain    History of the Present Illness     Roberto Mcguire is a 39 y.o. male  who reports to the office today for evaluation of his left hip.  He notes an onset of pain approximately 1.5 months ago.  He denies any injury or trauma.  He notes pain in the anterior hip and groin with occasional radiation posterior to the posterior hip.  Pain is worse with prolonged sitting as well as increased activity.  He does note occasional clicking and popping in the anterior hip.  He denies any weakness or instability.  He denies any radiation distally.  He denies any numbness and tingling.  He currently takes ibuprofen intermittently with minimal improvement.  No fevers or chills.    Physical Exam     /70   Ht 5' 10\" (1.778 m)   Wt 102 kg (224 lb)   BMI 32.14 kg/m²     Hip: No gross deformity.  There is tenderness over the anterior hip.  No tenderness greater trochanter.  Hip range of motion includes flexion 90, external rotation 40, internal rotation 30.  Pain with terminal flexion.  Positive MARILY and FADIR test.  Negative logroll test.  Negative straight leg raises.  Positive resisted straight leg raises.  Sensation is intact distally.    Eyes: Anicteric sclerae.  ENT: Trachea midline.  Lungs: Normal respiratory effort.  CV: Capillary refill is less than 2 seconds.  Skin: Intact without erythema.  Lymph: No palpable lymphadenopathy.  Neuro: Sensation is grossly intact to light touch.  Psych: Mood and affect are appropriate.    Data Review     I have personally reviewed pertinent films in " PACS, and my interpretation follows:    X-rays left hip 6/7/2024: No acute osseous abnormality.  No fracture or dislocation.  No significant degenerative changes.  There is evidence of some acetabular dysplasia.    Past Medical History:   Diagnosis Date    GERD (gastroesophageal reflux disease)     Hypertension     Lateral epicondylitis        Past Surgical History:   Procedure Laterality Date    APPENDECTOMY      LATERAL EPICONDYLE RELEASE Right 6/27/2017    Procedure: ELBOW LATERAL EPICONDYLAR RELEASE;  Surgeon: Star Sharif MD;  Location: [facility];  Service: Orthopedics       No Known Allergies    Current Outpatient Medications on File Prior to Visit   Medication Sig Dispense Refill    lisinopril-hydrochlorothiazide (PRINZIDE,ZESTORETIC) 20-12.5 MG per tablet Take 1 tablet by mouth daily 90 tablet 1    baclofen 10 mg tablet Take 1 tablet (10 mg total) by mouth daily at bedtime (Patient not taking: Reported on 3/19/2024) 30 tablet 0    diclofenac (VOLTAREN) 75 mg EC tablet Take 1 tablet (75 mg total) by mouth 2 (two) times a day (Patient not taking: Reported on 3/19/2024) 60 tablet 0    Diclofenac Sodium (VOLTAREN) 1 % Apply 2 g topically 4 (four) times a day (Patient not taking: Reported on 4/24/2024) 100 g 1     No current facility-administered medications on file prior to visit.       Social History     Tobacco Use    Smoking status: Never    Smokeless tobacco: Never   Vaping Use    Vaping status: Never Used   Substance Use Topics    Alcohol use: Yes     Comment: Social    Drug use: No       Family History   Problem Relation Age of Onset    Colon cancer Mother     Hypertension Father        Review of Systems     As stated in the HPI. All other systems reviewed and are negative.

## 2024-06-07 NOTE — LETTER
June 7, 2024     Patient: Roberto Mcguire  YOB: 1985  Date of Visit: 6/7/2024      To Whom it May Concern:    Roberto Mcguire is under my professional care. Roberto was seen in my office on 6/7/2024.  Continue current light duty restrictions until next evaluation in 6 weeks.    If you have any questions or concerns, please don't hesitate to call.         Sincerely,          Yogi Kennedy PA-C

## 2024-06-07 NOTE — LETTER
June 7, 2024     Patient: Roberto Mcguire  YOB: 1985  Date of Visit: 6/7/2024      To Whom it May Concern:    Roberto Mcguire is under my professional care. Roberto was seen in my office on 6/7/2024. Roberto is being treated for a left hip issue.  At this time advised against long distance traveling.    If you have any questions or concerns, please don't hesitate to call.         Sincerely,          Yogi Kennedy PA-C

## 2024-06-18 ENCOUNTER — EVALUATION (OUTPATIENT)
Dept: PHYSICAL THERAPY | Facility: MEDICAL CENTER | Age: 39
End: 2024-06-18
Payer: COMMERCIAL

## 2024-06-18 DIAGNOSIS — M25.859 FEMORAL ACETABULAR IMPINGEMENT: ICD-10-CM

## 2024-06-18 DIAGNOSIS — M76.892 HIP FLEXOR TENDINITIS, LEFT: ICD-10-CM

## 2024-06-18 PROCEDURE — 97161 PT EVAL LOW COMPLEX 20 MIN: CPT | Performed by: PHYSICAL THERAPIST

## 2024-06-18 NOTE — PROGRESS NOTES
PT Evaluation     Today's date: 2024  Patient name: Roberto Mcguire  : 1985  MRN: [id number]  Referring provider: Yogi Kennedy P*  Dx:   Encounter Diagnosis     ICD-10-CM    1. Hip flexor tendinitis, left  M76.892 Ambulatory Referral to Physical Therapy      2. Femoral acetabular impingement  M25.859 Ambulatory Referral to Physical Therapy                     Assessment  Impairments: abnormal muscle firing, abnormal muscle tone, abnormal or restricted ROM, impaired physical strength, lacks appropriate home exercise program and pain with function    Assessment details: Roberto Mcguire is a 39 y.o. male evaluated for left hip pain.   Examination findings are consistent with anterior hip irritation along with a lumbar component to his pain which responded favorably to extension based exercises.   He will continue with HEP and follow up in 2 weeks for assessment of his progress.        Understanding of Dx/Px/POC: good     Prognosis: good    Goals  Patient will successfully transition to home exercise program.  Patient will be able to manage symptoms independently.    Roberto will report no anterior hip pain with walking  Roberto will report no pain with bending   Roberto will report no pain with work activity     Plan  Patient would benefit from: skilled PT  Referral necessary: No  Planned modality interventions: thermotherapy: hydrocollator packs    Planned therapy interventions: home exercise program, manual therapy, neuromuscular re-education, patient education, functional ROM exercises, strengthening, stretching, joint mobilization, graded activity, graded exercise, therapeutic exercise, body mechanics training, motor coordination training and activity modification    Frequency: 2x month  Duration in weeks: 12  Treatment plan discussed with: patient        Subjective Evaluation    History of Present Illness  Mechanism of injury: Roberto Mcguire is a 39 y.o. male  presenting to therapy with complaints of left hip pain.  He notes pain began 1-2 months  ago with insidious onset.  He notes pain is in anterior part of hip and appreciates some back pain as well on the left side that corresponds with his hip pain.   He notes walking and sitting for long periods increases his pain.   Denies numbness or tingling, no radiation of pain down his leg.   He was seen by ortho and diagnosed with hip flexor tendonitis vs LEEROY  Patient Goals  Patient goals for therapy: decreased pain, increased motion, return to sport/leisure activities, independence with ADLs/IADLs and increased strength    Pain  Current pain ratin  At best pain ratin  At worst pain ratin  Quality: dull ache, discomfort, pressure, tight and pulling          Objective  Red Flag Screening:  History Cancer (-)  Bowl/Bladder dysfunction (-)  Night pain (-)  Unexplained weight loss (-)     Dermatomes:  WNL   Myotomes:  WNL    Reflexes:   Patellar R 2+, L 2+  Achilles R 2+, L 2+      Lumbar:  AROM:   Flexion WFL  Extension Mod restriction with pain at end range, L side low back and L anterior hip   Side bending Pain with L sidebending, reproduces L hip pain and low back pain   Rotation Unremarkable     Repeated Motion Testing: Repeated extension in standing with overpressure improved walking pain in anterior hip          PAIVM: Comparable sign with L2/3 UPA on L for low back pain and hip pain     Special Tests:   Crossed SLR (-), SLR (-) Prone instability (-)   Neural Dynamic Testing: Tibial Bias (-), Peroneal Bias (-)   Slump Testing (-) with and without axial compression       Hip   + FADIR and MARILY                            Precautions: None      Manuals             Prone UPA  L L2/3            Prone hip flexor stretch AF                                       Neuro Re-Ed                                                                                                        Ther Ex             Standing lumbar  extension over table 10            Standing hip flexor stretch 10 sec  X 5                                                                                          Ther Activity                                       Gait Training                                       Modalities

## 2024-06-19 ENCOUNTER — HOSPITAL ENCOUNTER (OUTPATIENT)
Dept: ULTRASOUND IMAGING | Facility: HOSPITAL | Age: 39
Discharge: HOME | End: 2024-06-19
Attending: UROLOGY
Payer: COMMERCIAL

## 2024-06-19 DIAGNOSIS — N45.1 EPIDIDYMITIS: ICD-10-CM

## 2024-06-19 PROCEDURE — 76870 US EXAM SCROTUM: CPT

## 2024-06-25 DIAGNOSIS — I10 ESSENTIAL HYPERTENSION: ICD-10-CM

## 2024-06-26 RX ORDER — LISINOPRIL AND HYDROCHLOROTHIAZIDE 20; 12.5 MG/1; MG/1
1 TABLET ORAL DAILY
Qty: 90 TABLET | Refills: 3 | Status: SHIPPED | OUTPATIENT
Start: 2024-06-26

## 2024-07-11 ENCOUNTER — APPOINTMENT (OUTPATIENT)
Dept: LAB | Facility: MEDICAL CENTER | Age: 39
End: 2024-07-11
Payer: OTHER MISCELLANEOUS

## 2024-07-11 DIAGNOSIS — Z01.812 PRE-OPERATIVE LABORATORY EXAMINATION: ICD-10-CM

## 2024-07-11 DIAGNOSIS — S50.02XD CONTUSION OF LEFT ELBOW, SUBSEQUENT ENCOUNTER: ICD-10-CM

## 2024-07-11 DIAGNOSIS — S54.02XA INJURY OF ULNAR NERVE AT LEFT FOREARM LEVEL, INITIAL ENCOUNTER: ICD-10-CM

## 2024-07-11 DIAGNOSIS — M77.12 LEFT LATERAL EPICONDYLITIS: ICD-10-CM

## 2024-07-11 LAB
ALBUMIN SERPL BCG-MCNC: 4.1 G/DL (ref 3.5–5)
ALP SERPL-CCNC: 59 U/L (ref 34–104)
ALT SERPL W P-5'-P-CCNC: 35 U/L (ref 7–52)
ANION GAP SERPL CALCULATED.3IONS-SCNC: 11 MMOL/L (ref 4–13)
AST SERPL W P-5'-P-CCNC: 26 U/L (ref 13–39)
BILIRUB SERPL-MCNC: 1.42 MG/DL (ref 0.2–1)
BUN SERPL-MCNC: 13 MG/DL (ref 5–25)
CALCIUM SERPL-MCNC: 9.3 MG/DL (ref 8.4–10.2)
CHLORIDE SERPL-SCNC: 103 MMOL/L (ref 96–108)
CO2 SERPL-SCNC: 24 MMOL/L (ref 21–32)
CREAT SERPL-MCNC: 0.84 MG/DL (ref 0.6–1.3)
ERYTHROCYTE [DISTWIDTH] IN BLOOD BY AUTOMATED COUNT: 12.2 % (ref 11.6–15.1)
GFR SERPL CREATININE-BSD FRML MDRD: 109 ML/MIN/1.73SQ M
GLUCOSE P FAST SERPL-MCNC: 77 MG/DL (ref 65–99)
HCT VFR BLD AUTO: 43.7 % (ref 36.5–49.3)
HGB BLD-MCNC: 14.6 G/DL (ref 12–17)
INR PPP: 1.01 (ref 0.84–1.19)
MCH RBC QN AUTO: 31.5 PG (ref 26.8–34.3)
MCHC RBC AUTO-ENTMCNC: 33.4 G/DL (ref 31.4–37.4)
MCV RBC AUTO: 94 FL (ref 82–98)
PLATELET # BLD AUTO: 222 THOUSANDS/UL (ref 149–390)
PMV BLD AUTO: 9.9 FL (ref 8.9–12.7)
POTASSIUM SERPL-SCNC: 4.1 MMOL/L (ref 3.5–5.3)
PROT SERPL-MCNC: 7.6 G/DL (ref 6.4–8.4)
PROTHROMBIN TIME: 13.2 SECONDS (ref 11.6–14.5)
RBC # BLD AUTO: 4.63 MILLION/UL (ref 3.88–5.62)
SODIUM SERPL-SCNC: 138 MMOL/L (ref 135–147)
WBC # BLD AUTO: 7.17 THOUSAND/UL (ref 4.31–10.16)

## 2024-07-11 PROCEDURE — 80053 COMPREHEN METABOLIC PANEL: CPT

## 2024-07-11 PROCEDURE — 36415 COLL VENOUS BLD VENIPUNCTURE: CPT

## 2024-07-11 PROCEDURE — 85027 COMPLETE CBC AUTOMATED: CPT

## 2024-07-11 PROCEDURE — 85610 PROTHROMBIN TIME: CPT

## 2024-07-15 ENCOUNTER — CONSULT (OUTPATIENT)
Dept: FAMILY MEDICINE CLINIC | Facility: CLINIC | Age: 39
End: 2024-07-15
Payer: OTHER MISCELLANEOUS

## 2024-07-15 VITALS
HEIGHT: 70 IN | BODY MASS INDEX: 31.73 KG/M2 | OXYGEN SATURATION: 97 % | DIASTOLIC BLOOD PRESSURE: 70 MMHG | HEART RATE: 80 BPM | TEMPERATURE: 98 F | SYSTOLIC BLOOD PRESSURE: 122 MMHG | WEIGHT: 221.6 LBS

## 2024-07-15 DIAGNOSIS — S56.512A PARTIAL TEAR OF COMMON EXTENSOR TENDON OF LEFT ELBOW: ICD-10-CM

## 2024-07-15 DIAGNOSIS — Z01.818 PREOPERATIVE CLEARANCE: Primary | ICD-10-CM

## 2024-07-15 PROBLEM — Z20.822 EXPOSURE TO COVID-19 VIRUS: Status: RESOLVED | Noted: 2020-11-12 | Resolved: 2024-07-15

## 2024-07-15 PROCEDURE — 93000 ELECTROCARDIOGRAM COMPLETE: CPT

## 2024-07-15 PROCEDURE — 99243 OFF/OP CNSLTJ NEW/EST LOW 30: CPT

## 2024-07-15 NOTE — PROGRESS NOTES
Ambulatory Visit  Name: Roberto Mcguire      : 1985      MRN: [id number]  Encounter Provider: Rita Pacheco PA-C  Encounter Date: 7/15/2024   Encounter department: [facility]    Assessment & Plan   1. Preoperative clearance  Assessment & Plan:  Patient presents today for preoperative clearance for upcoming surgery. Patient's medical history and medication list were reviewed and updated accordingly. Patient feels in their usual state of health with no acute concerns.     EKG completed today - NSR, no abnormalities. Hx of HTN, no other cardiac history. Labs reviewed, normal findings with no preop concerns. Exam today unremarkable, patient is in good health for his age. VSS. HTN stable. No other significant medical history.     Detsky Cardiac Risk Index assesses perioperative risk of cardiovascular events in patients about to undergo noncardiac surgery.  This scale considers cardiac history, functional status, age, and overall medical condition to determine risk prior to surgical procedures.  I reviewed with patient today that all surgeries have risk as discussed with their surgeon, however this risk index is an accurate predictor of perioperative complications. Patient has low risk for surgery according to Detsky Cardiac Risk Index.      Patient is fully cleared for surgery. May follow up as needed.   Orders:  -     POCT ECG  2. Partial tear of common extensor tendon of left elbow  Assessment & Plan:  Recommended to have surgical repair on his extensor tendon injury that occurred at work. He has discussed risks and benefits of surgery with his surgeon and would like to proceed with surgery.          History of Present Illness     Patient presents today for preoperative clearance for upcoming surgery. Surgery information is as follows:     Type of Surgery: Topaz Elbow microdebridement, repair common extensor tendon, left ulnar nerve transposition, ulnar nerve decompression at  elbow  Diagnosis: lateral epicondylitis  Surgeon: Dr. Heard   Date of Surgery: 7/16/2024  Previous Anesthesia: Yes  Complications from Anesthesia: No  Bleeding and Clotting Disorders: No   Blood Thinners: No  Pertinent PMH:    Cardiac Hx: HTN   Pulmonary Hx: none  Activity Tolerance (walk 4 blocks without issues, can walk up 2 flights of stairs): Yes  Home Safety (patient feels safe at home post-op): Yes    Smoking: No  Alcohol Use: Yes, occasionally   Illicit Drug Use: No    Patient feels in their usual state of health with no acute concerns.       Review of Systems   Constitutional:  Negative for chills and fever.   HENT:  Negative for congestion, sinus pressure, sore throat and trouble swallowing.    Respiratory:  Negative for cough, chest tightness and shortness of breath.    Cardiovascular:  Negative for chest pain, palpitations and leg swelling.   Gastrointestinal:  Negative for abdominal pain, diarrhea, nausea and vomiting.   Genitourinary:  Negative for difficulty urinating, dysuria and hematuria.   Musculoskeletal:  Negative for arthralgias, back pain and myalgias.   Neurological:  Negative for dizziness, seizures, syncope, light-headedness and headaches.   Hematological:  Does not bruise/bleed easily.   Psychiatric/Behavioral:  Negative for behavioral problems and confusion. The patient is not nervous/anxious.    All other systems reviewed and are negative.    Past Medical History:   Diagnosis Date    GERD (gastroesophageal reflux disease)     Hypertension     Lateral epicondylitis      Past Surgical History:   Procedure Laterality Date    APPENDECTOMY      LATERAL EPICONDYLE RELEASE Right 6/27/2017    Procedure: ELBOW LATERAL EPICONDYLAR RELEASE;  Surgeon: Star Sharif MD;  Location: [facility];  Service: Orthopedics     Family History   Problem Relation Age of Onset    Colon cancer Mother     Hypertension Father      Social History     Tobacco Use    Smoking status: Never    Smokeless tobacco:  "Never   Vaping Use    Vaping status: Never Used   Substance and Sexual Activity    Alcohol use: Yes     Comment: Social    Drug use: No    Sexual activity: Not on file     Current Outpatient Medications on File Prior to Visit   Medication Sig    lisinopril-hydrochlorothiazide (PRINZIDE,ZESTORETIC) 20-12.5 MG per tablet Take 1 tablet by mouth daily    Diclofenac Sodium (VOLTAREN) 1 % Apply 2 g topically 4 (four) times a day (Patient not taking: Reported on 4/24/2024)    [DISCONTINUED] baclofen 10 mg tablet Take 1 tablet (10 mg total) by mouth daily at bedtime (Patient not taking: Reported on 3/19/2024)    [DISCONTINUED] diclofenac (VOLTAREN) 75 mg EC tablet Take 1 tablet (75 mg total) by mouth 2 (two) times a day (Patient not taking: Reported on 3/19/2024)    [DISCONTINUED] meloxicam (Mobic) 15 mg tablet Take 1 tablet (15 mg total) by mouth daily (Patient not taking: Reported on 7/15/2024)     No Known Allergies  Immunization History   Administered Date(s) Administered    COVID-19 MODERNA VACC 0.5 ML IM 04/05/2021, 05/06/2021, 07/08/2022    Influenza, injectable, quadrivalent, preservative free 0.5 mL 10/10/2019, 11/08/2023    Influenza, seasonal, injectable 10/25/2020    Tdap 02/09/2016     Objective     /70 (BP Location: Left arm, Patient Position: Sitting, Cuff Size: Standard)   Pulse 80   Temp 98 °F (36.7 °C)   Ht 5' 10.08\" (1.78 m)   Wt 101 kg (221 lb 9.6 oz)   SpO2 97%   BMI 31.72 kg/m²     Physical Exam  Vitals and nursing note reviewed.   Constitutional:       General: He is not in acute distress.     Appearance: Normal appearance. He is normal weight. He is not ill-appearing.   HENT:      Head: Normocephalic and atraumatic.      Right Ear: Tympanic membrane normal.      Left Ear: Tympanic membrane normal.      Nose: Nose normal.      Mouth/Throat:      Mouth: Mucous membranes are moist.      Pharynx: Oropharynx is clear. No oropharyngeal exudate or posterior oropharyngeal erythema.   Eyes:      " Extraocular Movements: Extraocular movements intact.      Pupils: Pupils are equal, round, and reactive to light.   Neck:      Vascular: No carotid bruit.   Cardiovascular:      Rate and Rhythm: Normal rate and regular rhythm.      Heart sounds: No murmur heard.  Pulmonary:      Effort: Pulmonary effort is normal. No respiratory distress.      Breath sounds: Normal breath sounds.   Abdominal:      General: Bowel sounds are normal.      Palpations: Abdomen is soft.      Tenderness: There is no abdominal tenderness.   Musculoskeletal:         General: Normal range of motion.      Cervical back: Normal range of motion and neck supple.      Right lower leg: No edema.      Left lower leg: No edema.   Lymphadenopathy:      Cervical: No cervical adenopathy.   Neurological:      General: No focal deficit present.      Mental Status: He is alert and oriented to person, place, and time. Mental status is at baseline.   Psychiatric:         Mood and Affect: Mood normal.         Behavior: Behavior normal.         Judgment: Judgment normal.         Rita Pacheco PA-C  [facility]

## 2024-07-15 NOTE — ASSESSMENT & PLAN NOTE
Patient presents today for preoperative clearance for upcoming surgery. Patient's medical history and medication list were reviewed and updated accordingly. Patient feels in their usual state of health with no acute concerns.     EKG completed today - NSR, no abnormalities. Hx of HTN, no other cardiac history. Labs reviewed, normal findings with no preop concerns. Exam today unremarkable, patient is in good health for his age. VSS. HTN stable. No other significant medical history.     Detsky Cardiac Risk Index assesses perioperative risk of cardiovascular events in patients about to undergo noncardiac surgery.  This scale considers cardiac history, functional status, age, and overall medical condition to determine risk prior to surgical procedures.  I reviewed with patient today that all surgeries have risk as discussed with their surgeon, however this risk index is an accurate predictor of perioperative complications. Patient has low risk for surgery according to Detsky Cardiac Risk Index.      Patient is fully cleared for surgery. May follow up as needed.

## 2024-07-15 NOTE — ASSESSMENT & PLAN NOTE
Recommended to have surgical repair on his extensor tendon injury that occurred at work. He has discussed risks and benefits of surgery with his surgeon and would like to proceed with surgery.

## 2024-07-19 ENCOUNTER — OFFICE VISIT (OUTPATIENT)
Dept: OBGYN CLINIC | Facility: CLINIC | Age: 39
End: 2024-07-19
Payer: COMMERCIAL

## 2024-07-19 VITALS
DIASTOLIC BLOOD PRESSURE: 70 MMHG | WEIGHT: 221.4 LBS | HEIGHT: 70 IN | SYSTOLIC BLOOD PRESSURE: 134 MMHG | BODY MASS INDEX: 31.7 KG/M2

## 2024-07-19 DIAGNOSIS — M25.859 FEMORAL ACETABULAR IMPINGEMENT: ICD-10-CM

## 2024-07-19 DIAGNOSIS — M76.892 HIP FLEXOR TENDINITIS, LEFT: Primary | ICD-10-CM

## 2024-07-19 PROCEDURE — 99213 OFFICE O/P EST LOW 20 MIN: CPT | Performed by: PHYSICIAN ASSISTANT

## 2024-07-19 RX ORDER — MELOXICAM 15 MG/1
15 TABLET ORAL DAILY
Qty: 30 TABLET | Refills: 0 | Status: SHIPPED | OUTPATIENT
Start: 2024-07-19

## 2024-07-19 RX ORDER — TRAMADOL HYDROCHLORIDE 50 MG/1
50 TABLET ORAL
COMMUNITY
Start: 2024-07-16

## 2024-07-19 NOTE — PROGRESS NOTES
"Patient Name:  Roberto Mcguire  MRN:  [id number]    Assessment & Plan     Improving left hip flexor tendonitis, LEEROY, likely mild Left SI joint dysfunction.  Patient notes 80% improvement in his symptoms since initial presentation with HEP and meloxicam.  Recommend continued conservative management with HEP and meloxicam. Refill provided today.  Activities as tolerated with modification to avoid pain.  Follow up in 6-8 weeks. Discussed advanced imaging at that time as appropriate.    Chief Complaint     Follow up left hip pain.    History of the Present Illness     Roberto Mcguire is a 39 y.o. male who returns to the office today for follow up regarding his left hip. He was initially seen on 6/7/24. At that time he was prescribed meloxicam and referred to PT. He returns to the office today noting approximately 80% improvement of his symptoms. He still notes occasional discomfort in the anterior hip and groin with slight discomfort in the left low back in the region of the SI Joint as well. No radiating pain distally. No weakness or instability. No numbness/tingling. No fever/chills. Patient did recently undergo left elbow surgery for lateral epicondylitis as well.    Physical Exam     /70 (BP Location: Right arm, Patient Position: Sitting, Cuff Size: Standard)   Ht 5' 10\" (1.778 m)   Wt 100 kg (221 lb 6.4 oz)   BMI 31.77 kg/m²     Left Hip: No gross deformity. No TTP anterior hip and greater trochanter. Slight tenderness left SI joint. No TTP midline lumbar spine, paraspinal musculature, and piriformis. Full hip ROM without significant pain. Mildly positive MARILY and FADIR test. Negative logroll test. Negative SLR and resisted SLR test. Sensation intact distally.    Eyes: Anicteric sclerae.  ENT: Trachea midline.  Lungs: Normal respiratory effort.  CV: Capillary refill is less than 2 seconds.  Skin: Intact without erythema.  Lymph: No palpable lymphadenopathy.  Neuro: Sensation is grossly " intact to light touch.  Psych: Mood and affect are appropriate.    Past Medical History:   Diagnosis Date    GERD (gastroesophageal reflux disease)     Hypertension     Lateral epicondylitis        Past Surgical History:   Procedure Laterality Date    APPENDECTOMY      LATERAL EPICONDYLE RELEASE Right 6/27/2017    Procedure: ELBOW LATERAL EPICONDYLAR RELEASE;  Surgeon: Star Sharif MD;  Location: [facility];  Service: Orthopedics       No Known Allergies    Current Outpatient Medications on File Prior to Visit   Medication Sig Dispense Refill    Diclofenac Sodium (VOLTAREN) 1 % Apply 2 g topically 4 (four) times a day 100 g 1    lisinopril-hydrochlorothiazide (PRINZIDE,ZESTORETIC) 20-12.5 MG per tablet Take 1 tablet by mouth daily 90 tablet 3    traMADol (ULTRAM) 50 mg tablet 50 mg       No current facility-administered medications on file prior to visit.       Social History     Tobacco Use    Smoking status: Never    Smokeless tobacco: Never   Vaping Use    Vaping status: Never Used   Substance Use Topics    Alcohol use: Yes     Comment: Social    Drug use: No       Family History   Problem Relation Age of Onset    Colon cancer Mother     Hypertension Father        Review of Systems     As stated in the HPI. All other systems reviewed and are negative.

## 2024-08-03 DIAGNOSIS — I10 ESSENTIAL HYPERTENSION: ICD-10-CM

## 2024-08-04 RX ORDER — LISINOPRIL AND HYDROCHLOROTHIAZIDE 20; 12.5 MG/1; MG/1
1 TABLET ORAL DAILY
Qty: 90 TABLET | Refills: 1 | Status: SHIPPED | OUTPATIENT
Start: 2024-08-04

## 2024-08-27 ENCOUNTER — OFFICE VISIT (OUTPATIENT)
Dept: OBGYN CLINIC | Facility: CLINIC | Age: 39
End: 2024-08-27
Payer: COMMERCIAL

## 2024-08-27 VITALS
DIASTOLIC BLOOD PRESSURE: 74 MMHG | BODY MASS INDEX: 31.81 KG/M2 | SYSTOLIC BLOOD PRESSURE: 122 MMHG | HEIGHT: 70 IN | WEIGHT: 222.2 LBS

## 2024-08-27 DIAGNOSIS — M76.892 HIP FLEXOR TENDINITIS, LEFT: Primary | ICD-10-CM

## 2024-08-27 DIAGNOSIS — M25.859 FEMORAL ACETABULAR IMPINGEMENT: ICD-10-CM

## 2024-08-27 PROCEDURE — 99213 OFFICE O/P EST LOW 20 MIN: CPT | Performed by: PHYSICIAN ASSISTANT

## 2024-08-27 NOTE — PROGRESS NOTES
"Patient Name:  Roberto Mcguire  MRN:  [id number]    Assessment & Plan     Improving left hip pain.  Likely hip flexor tendinitis versus LEEROY.  Continue conservative management with home exercises and meloxicam as needed.  Gradually return to activities as tolerated, no restrictions.  Follow-up as needed.  MRI not indicated at this time.  Patient was advised to contact the office if his pain worsens to consider MRI at that time as indicated.    Chief Complaint     Follow-up left hip pain    History of the Present Illness     Roberto Mcguire is a 39 y.o. male who returns to the office today for follow-up regarding his left hip.  He was last evaluated on 7/19/2024.  At that point he reported 80% improvement of his symptoms since his initial presentation on 6/7/2024.  At that time he was advised to continue conservative management consisting of home exercise program and meloxicam.  He returns to the office today noting continued improvement with the above conservative measures.  He still notes intermittent discomfort in the anterior hip with certain movements.  He noted a flareup of his pain a few weeks ago after bending over to pick something up.  Since then his pain has improved significantly.  He denies any radiating pain distally.  No weakness or instability.  No numbness or tingling.  He continues to perform home exercises and takes meloxicam as needed.  No fevers or chills.  Overall he is extremely happy with his progress.    Physical Exam     /74 (BP Location: Right arm, Patient Position: Sitting, Cuff Size: Standard)   Ht 5' 10\" (1.778 m)   Wt 101 kg (222 lb 3.2 oz)   BMI 31.88 kg/m²     Left Hip: No gross deformity. No TTP anterior hip and greater trochanter. No TTP midline lumbar spine, paraspinal musculature, left SI joint, and piriformis. Full hip ROM without significant pain. Mildly positive MARILY and FADIR test. Negative logroll test. Negative SLR.  Slight pain with resisted SLR test. " Sensation intact distally.     Eyes: Anicteric sclerae.  ENT: Trachea midline.  Lungs: Normal respiratory effort.  CV: Capillary refill is less than 2 seconds.  Skin: Intact without erythema.  Lymph: No palpable lymphadenopathy.  Neuro: Sensation is grossly intact to light touch.  Psych: Mood and affect are appropriate.    Past Medical History:   Diagnosis Date    GERD (gastroesophageal reflux disease)     Hypertension     Lateral epicondylitis        Past Surgical History:   Procedure Laterality Date    APPENDECTOMY      LATERAL EPICONDYLE RELEASE Right 6/27/2017    Procedure: ELBOW LATERAL EPICONDYLAR RELEASE;  Surgeon: Star Sharif MD;  Location:  MAIN OR;  Service: Orthopedics       No Known Allergies    Current Outpatient Medications on File Prior to Visit   Medication Sig Dispense Refill    Diclofenac Sodium (VOLTAREN) 1 % Apply 2 g topically 4 (four) times a day 100 g 1    lisinopril-hydrochlorothiazide (PRINZIDE,ZESTORETIC) 20-12.5 MG per tablet Take 1 tablet by mouth daily 90 tablet 1    meloxicam (Mobic) 15 mg tablet Take 1 tablet (15 mg total) by mouth daily 30 tablet 0    traMADol (ULTRAM) 50 mg tablet 50 mg (Patient not taking: Reported on 8/27/2024)       No current facility-administered medications on file prior to visit.       Social History     Tobacco Use    Smoking status: Never    Smokeless tobacco: Never   Vaping Use    Vaping status: Never Used   Substance Use Topics    Alcohol use: Yes     Comment: Social    Drug use: No       Family History   Problem Relation Age of Onset    Colon cancer Mother     Hypertension Father        Review of Systems     As stated in the HPI. All other systems reviewed and are negative.

## 2024-11-09 DIAGNOSIS — Z00.6 ENCOUNTER FOR EXAMINATION FOR NORMAL COMPARISON OR CONTROL IN CLINICAL RESEARCH PROGRAM: ICD-10-CM

## 2024-11-25 ENCOUNTER — OFFICE VISIT (OUTPATIENT)
Dept: FAMILY MEDICINE CLINIC | Facility: CLINIC | Age: 39
End: 2024-11-25
Payer: COMMERCIAL

## 2024-11-25 VITALS
OXYGEN SATURATION: 98 % | SYSTOLIC BLOOD PRESSURE: 122 MMHG | BODY MASS INDEX: 33.77 KG/M2 | WEIGHT: 228 LBS | HEIGHT: 69 IN | TEMPERATURE: 98.5 F | DIASTOLIC BLOOD PRESSURE: 78 MMHG | HEART RATE: 93 BPM

## 2024-11-25 DIAGNOSIS — K62.5 RECTAL BLEED: Primary | ICD-10-CM

## 2024-11-25 DIAGNOSIS — Z80.0 FAMILY HISTORY OF COLON CANCER: ICD-10-CM

## 2024-11-25 PROCEDURE — 99213 OFFICE O/P EST LOW 20 MIN: CPT | Performed by: FAMILY MEDICINE

## 2024-11-25 NOTE — PROGRESS NOTES
Name: Roberto Mcguire      : 1985      MRN: [id number]  Encounter Provider: Anton Fregoso DO  Encounter Date: 2024   Encounter department: [facility]    Assessment & Plan  Rectal bleed  Examination unremarkable though based on history patient may have a fissure causing intermittent rectal bleeding.  Due to his concern and family history of colon cancer, will refer to GI for colonoscopy.  Orders:    Ambulatory Referral to Gastroenterology; Future    Family history of colon cancer    Orders:    Ambulatory Referral to Gastroenterology; Future         History of Present Illness     Patient complains of episodes of rectal bleeding particularly after difficult bowel movements.  He is concerned because he has a family history of colon cancer.  Would like to be referred to GI for colonoscopy      Review of Systems   Gastrointestinal:  Positive for anal bleeding.     Past Medical History:   Diagnosis Date    GERD (gastroesophageal reflux disease)     Hypertension     Lateral epicondylitis      Past Surgical History:   Procedure Laterality Date    APPENDECTOMY      LATERAL EPICONDYLE RELEASE Right 2017    Procedure: ELBOW LATERAL EPICONDYLAR RELEASE;  Surgeon: Star Sharif MD;  Location: [facility];  Service: Orthopedics     Family History   Problem Relation Age of Onset    Colon cancer Mother     Hypertension Father      Social History     Tobacco Use    Smoking status: Never    Smokeless tobacco: Never   Vaping Use    Vaping status: Never Used   Substance and Sexual Activity    Alcohol use: Yes     Comment: Social    Drug use: No    Sexual activity: Not on file     Current Outpatient Medications on File Prior to Visit   Medication Sig    lisinopril-hydrochlorothiazide (PRINZIDE,ZESTORETIC) 20-12.5 MG per tablet Take 1 tablet by mouth daily    meloxicam (Mobic) 15 mg tablet Take 1 tablet (15 mg total) by mouth daily    Diclofenac Sodium (VOLTAREN) 1 % Apply 2 g  "topically 4 (four) times a day (Patient not taking: Reported on 11/25/2024)    traMADol (ULTRAM) 50 mg tablet 50 mg (Patient not taking: Reported on 11/25/2024)     No Known Allergies  Immunization History   Administered Date(s) Administered    COVID-19 MODERNA VACC 0.5 ML IM 04/05/2021, 05/06/2021, 07/08/2022    Influenza, injectable, quadrivalent, preservative free 0.5 mL 10/10/2019, 11/08/2023    Influenza, seasonal, injectable 10/25/2020    Tdap 02/09/2016     Objective   /78   Pulse 93   Temp 98.5 °F (36.9 °C)   Ht 5' 9.29\" (1.76 m)   Wt 103 kg (228 lb)   SpO2 98%   BMI 33.39 kg/m²     Physical Exam  Genitourinary:     Rectum: Normal.         "

## 2024-12-18 ENCOUNTER — OFFICE VISIT (OUTPATIENT)
Dept: GASTROENTEROLOGY | Facility: MEDICAL CENTER | Age: 39
End: 2024-12-18
Payer: COMMERCIAL

## 2024-12-18 ENCOUNTER — TELEPHONE (OUTPATIENT)
Dept: GASTROENTEROLOGY | Facility: MEDICAL CENTER | Age: 39
End: 2024-12-18

## 2024-12-18 ENCOUNTER — APPOINTMENT (OUTPATIENT)
Dept: LAB | Facility: MEDICAL CENTER | Age: 39
End: 2024-12-18

## 2024-12-18 VITALS
DIASTOLIC BLOOD PRESSURE: 76 MMHG | OXYGEN SATURATION: 98 % | HEART RATE: 96 BPM | WEIGHT: 228.8 LBS | BODY MASS INDEX: 33.89 KG/M2 | SYSTOLIC BLOOD PRESSURE: 136 MMHG | TEMPERATURE: 97.8 F | HEIGHT: 69 IN

## 2024-12-18 DIAGNOSIS — Z00.6 ENCOUNTER FOR EXAMINATION FOR NORMAL COMPARISON OR CONTROL IN CLINICAL RESEARCH PROGRAM: ICD-10-CM

## 2024-12-18 DIAGNOSIS — K62.5 RECTAL BLEED: ICD-10-CM

## 2024-12-18 DIAGNOSIS — Z80.0 FAMILY HISTORY OF COLON CANCER: ICD-10-CM

## 2024-12-18 PROCEDURE — 36415 COLL VENOUS BLD VENIPUNCTURE: CPT

## 2024-12-18 PROCEDURE — 99203 OFFICE O/P NEW LOW 30 MIN: CPT | Performed by: STUDENT IN AN ORGANIZED HEALTH CARE EDUCATION/TRAINING PROGRAM

## 2024-12-18 RX ORDER — SODIUM CHLORIDE, SODIUM LACTATE, POTASSIUM CHLORIDE, CALCIUM CHLORIDE 600; 310; 30; 20 MG/100ML; MG/100ML; MG/100ML; MG/100ML
125 INJECTION, SOLUTION INTRAVENOUS CONTINUOUS
OUTPATIENT
Start: 2024-12-18

## 2024-12-18 NOTE — PROGRESS NOTES
Name: Roberto Mcguire      : 1985      MRN: [id number]  Encounter Provider: Ranjana Raymond MD  Encounter Date: 2024   Encounter department[facility]  :  Assessment & Plan  Rectal bleed  Symptoms suggestive of hemorrhoids/anal irritation from aggressive wiping however warrants colonoscopy to ensure no underlying malignancy, especially given increased CRC risk from family history.    Orders:    Ambulatory Referral to Gastroenterology    polyethylene glycol (COLYTE) 4000 mL solution; Take 4,000 mL by mouth once for 1 dose    Colonoscopy; Future  Start powdered fiber supplement  Discussed bidet toilet seat as alternative to aggressive wiping  Family history of colon cancer  Mother with CRC around age 50  Orders:    Ambulatory Referral to Gastroenterology    polyethylene glycol (COLYTE) 4000 mL solution; Take 4,000 mL by mouth once for 1 dose    Colonoscopy; Future        History of Present Illness   HPI  Roberto Mcguire is a 39 y.o. male who presents for rectal bleeding.    For 2 months, has been getting some anal itching/burning and sees small dots of blood on toilet paper. Tried hemorrhoid cream which helps a little bit.    Has 2-3 BM per day, stool is formed.      Has gained 15# with being out of work for arm surgery.    Gets rare heartburn related to foods, no dysphagia.    Mother had colon cancer around 50.    No prior colonoscopy        Review of Systems   Constitutional:  Negative for chills and fever.   HENT:  Negative for ear pain and sore throat.    Eyes:  Negative for pain and visual disturbance.   Respiratory:  Negative for cough and shortness of breath.    Cardiovascular:  Negative for chest pain and palpitations.   Gastrointestinal:  Negative for abdominal pain and vomiting.   Genitourinary:  Negative for dysuria and hematuria.   Musculoskeletal:  Positive for myalgias. Negative for arthralgias and back pain.   Skin:  Negative for color  "change and rash.   Neurological:  Negative for seizures and syncope.   All other systems reviewed and are negative.         Objective   /76 (BP Location: Right arm)   Pulse 96   Temp 97.8 °F (36.6 °C)   Ht 5' 9.29\" (1.76 m)   Wt 104 kg (228 lb 12.8 oz)   SpO2 98%   BMI 33.51 kg/m²      Physical Exam  Vitals and nursing note reviewed.   Constitutional:       General: He is not in acute distress.     Appearance: He is well-developed.   HENT:      Head: Normocephalic and atraumatic.   Eyes:      Conjunctiva/sclera: Conjunctivae normal.   Cardiovascular:      Rate and Rhythm: Normal rate and regular rhythm.      Heart sounds: No murmur heard.  Pulmonary:      Effort: Pulmonary effort is normal. No respiratory distress.      Breath sounds: Normal breath sounds.   Abdominal:      Palpations: Abdomen is soft.      Tenderness: There is no abdominal tenderness.   Musculoskeletal:         General: No swelling.      Cervical back: Neck supple.   Skin:     General: Skin is warm and dry.      Capillary Refill: Capillary refill takes less than 2 seconds.   Neurological:      Mental Status: He is alert.   Psychiatric:         Mood and Affect: Mood normal.           "

## 2024-12-18 NOTE — TELEPHONE ENCOUNTER
Procedure: Colonoscopy   Date: 02/20/2025  Physician performing: Dr. Raymond  Location of procedure:  [place]  Instructions given to patient: Golytely   Diabetic: N/A  Clearances: N/A

## 2024-12-30 LAB
APOB+LDLR+PCSK9 GENE MUT ANL BLD/T: NOT DETECTED
BRCA1+BRCA2 DEL+DUP + FULL MUT ANL BLD/T: NOT DETECTED
MLH1+MSH2+MSH6+PMS2 GN DEL+DUP+FUL M: NOT DETECTED

## 2025-02-05 ENCOUNTER — ANESTHESIA (OUTPATIENT)
Dept: ANESTHESIOLOGY | Facility: HOSPITAL | Age: 40
End: 2025-02-05

## 2025-02-05 ENCOUNTER — ANESTHESIA EVENT (OUTPATIENT)
Dept: ANESTHESIOLOGY | Facility: HOSPITAL | Age: 40
End: 2025-02-05

## 2025-02-14 ENCOUNTER — TELEPHONE (OUTPATIENT)
Dept: GASTROENTEROLOGY | Facility: MEDICAL CENTER | Age: 40
End: 2025-02-14

## 2025-02-14 NOTE — TELEPHONE ENCOUNTER
Confirming Upcoming Procedure: Colonoscopy   on 02/20/2025  Physician performing: Dr. Raymond  Location of procedure:  [facility]  Prep: Golytely    Left VM.

## 2025-02-20 ENCOUNTER — ANESTHESIA (OUTPATIENT)
Dept: GASTROENTEROLOGY | Facility: MEDICAL CENTER | Age: 40
End: 2025-02-20
Payer: COMMERCIAL

## 2025-02-20 ENCOUNTER — ANESTHESIA EVENT (OUTPATIENT)
Dept: GASTROENTEROLOGY | Facility: MEDICAL CENTER | Age: 40
End: 2025-02-20
Payer: COMMERCIAL

## 2025-02-20 ENCOUNTER — HOSPITAL ENCOUNTER (OUTPATIENT)
Dept: GASTROENTEROLOGY | Facility: MEDICAL CENTER | Age: 40
Setting detail: OUTPATIENT SURGERY
Discharge: HOME | End: 2025-02-20
Payer: COMMERCIAL

## 2025-02-20 VITALS
HEART RATE: 64 BPM | SYSTOLIC BLOOD PRESSURE: 100 MMHG | RESPIRATION RATE: 18 BRPM | DIASTOLIC BLOOD PRESSURE: 62 MMHG | HEIGHT: 69 IN | OXYGEN SATURATION: 95 % | BODY MASS INDEX: 32.58 KG/M2 | TEMPERATURE: 97.5 F | WEIGHT: 220 LBS

## 2025-02-20 DIAGNOSIS — I10 ESSENTIAL HYPERTENSION: ICD-10-CM

## 2025-02-20 DIAGNOSIS — K62.5 RECTAL BLEED: ICD-10-CM

## 2025-02-20 DIAGNOSIS — Z80.0 FAMILY HISTORY OF COLON CANCER: ICD-10-CM

## 2025-02-20 PROCEDURE — 45378 DIAGNOSTIC COLONOSCOPY: CPT | Performed by: STUDENT IN AN ORGANIZED HEALTH CARE EDUCATION/TRAINING PROGRAM

## 2025-02-20 RX ORDER — PROPOFOL 10 MG/ML
INJECTION, EMULSION INTRAVENOUS AS NEEDED
Status: DISCONTINUED | OUTPATIENT
Start: 2025-02-20 | End: 2025-02-20

## 2025-02-20 RX ORDER — SODIUM CHLORIDE, SODIUM LACTATE, POTASSIUM CHLORIDE, CALCIUM CHLORIDE 600; 310; 30; 20 MG/100ML; MG/100ML; MG/100ML; MG/100ML
125 INJECTION, SOLUTION INTRAVENOUS CONTINUOUS
Status: DISCONTINUED | OUTPATIENT
Start: 2025-02-20 | End: 2025-02-24 | Stop reason: HOSPADM

## 2025-02-20 RX ADMIN — Medication 40 MG: at 15:10

## 2025-02-20 RX ADMIN — PROPOFOL 50 MG: 10 INJECTION, EMULSION INTRAVENOUS at 15:21

## 2025-02-20 RX ADMIN — PROPOFOL 50 MG: 10 INJECTION, EMULSION INTRAVENOUS at 15:23

## 2025-02-20 RX ADMIN — PROPOFOL 50 MG: 10 INJECTION, EMULSION INTRAVENOUS at 15:09

## 2025-02-20 RX ADMIN — SODIUM CHLORIDE, SODIUM LACTATE, POTASSIUM CHLORIDE, AND CALCIUM CHLORIDE 125 ML/HR: .6; .31; .03; .02 INJECTION, SOLUTION INTRAVENOUS at 14:40

## 2025-02-20 RX ADMIN — PROPOFOL 140 MG: 10 INJECTION, EMULSION INTRAVENOUS at 15:07

## 2025-02-20 RX ADMIN — PROPOFOL 50 MG: 10 INJECTION, EMULSION INTRAVENOUS at 15:12

## 2025-02-20 RX ADMIN — PROPOFOL 50 MG: 10 INJECTION, EMULSION INTRAVENOUS at 15:16

## 2025-02-20 NOTE — ANESTHESIA PREPROCEDURE EVALUATION
"\"Patient complains of episodes of rectal bleeding particularly after difficult bowel movements. He is concerned because he has a family history of colon cancer. Would like to be referred to GI for colonoscopy \"    Procedure:  COLONOSCOPY    Relevant Problems   CARDIO   (+) Essential hypertension      MUSCULOSKELETAL   (+) Lateral epicondylitis      PULMONARY   (+) Obstructive sleep apnea      Holter monitoring revealed predominant sinus rhythm with an average heart rate of 86 BPM, a minimum heart rate of 58 BPM, and a maximum heart rate of 138 BPM.     There were about 1497 ventricular ectopic beats, mostly occurring as single PVC's with no evidence of ventricular tachycardia.     There were about 4 supraventricular ectopic beats, all occurring as single PAC's with no evidence of supraventricular tachycardia, atrial fibrillation, or atrial flutter.     There was no evidence of significant bradyarrhythmia or advanced heart block.  The longest R-R interval was 1.4 seconds.  Physical Exam    Airway    Mallampati score: II  TM Distance: >3 FB  Neck ROM: full     Dental   No notable dental hx     Cardiovascular  Rhythm: regular, No weak pulses    Pulmonary   No stridor    Other Findings        Anesthesia Plan  ASA Score- 2     Anesthesia Type- IV sedation with anesthesia with ASA Monitors.         Additional Monitors:     Airway Plan:            Plan Factors-    Chart reviewed.   Existing labs reviewed. Patient summary reviewed.                  Induction- intravenous.    Postoperative Plan-         Informed Consent- Anesthetic plan and risks discussed with patient.  I personally reviewed this patient with the CRNA. Discussed and agreed on the Anesthesia Plan with the CRNA..      NPO Status:  No vitals data found for the desired time range.        "

## 2025-02-20 NOTE — ANESTHESIA POSTPROCEDURE EVALUATION
Post-Op Assessment Note    CV Status:  Stable  Pain Score: 0    Pain management: adequate       Mental Status:  Sleepy   Hydration Status:  Euvolemic   PONV Controlled:  Controlled   Airway Patency:  Patent     Post Op Vitals Reviewed: Yes    No anethesia notable event occurred.    Staff: Anesthesiologist, CRNA           Last Filed PACU Vitals:  Vitals Value Taken Time   Temp     Pulse 81 02/20/25 1530   BP 99/53 02/20/25 1530   Resp 20 02/20/25 1530   SpO2 94 % 02/20/25 1530       Modified Ronak:     Vitals Value Taken Time   Activity 2 02/20/25 1533   Respiration 2 02/20/25 1533   Circulation 2 02/20/25 1533   Consciousness 1 02/20/25 1533   Oxygen Saturation 2 02/20/25 1533     Modified Ronak Score: 9

## 2025-02-20 NOTE — H&P
"History and Physical - [facility]  Roberto Mcguire 39 y.o. male MRN: [id number]                  HPI: Roberto Mcguire is a 39 y.o. year old male who presents for rectal bleeding, family history of colon cancer.      REVIEW OF SYSTEMS: Per the HPI, and otherwise unremarkable.    Historical Information   Past Medical History:   Diagnosis Date    GERD (gastroesophageal reflux disease)     Hypertension     Lateral epicondylitis      Past Surgical History:   Procedure Laterality Date    APPENDECTOMY      LATERAL EPICONDYLE RELEASE Right 6/27/2017    Procedure: ELBOW LATERAL EPICONDYLAR RELEASE;  Surgeon: Star Sharif MD;  Location: [facility];  Service: Orthopedics     Social History   Social History     Substance and Sexual Activity   Alcohol Use Yes    Comment: Social     Social History     Substance and Sexual Activity   Drug Use No     Social History     Tobacco Use   Smoking Status Never   Smokeless Tobacco Never     Family History   Problem Relation Age of Onset    Colon cancer Mother     Hypertension Father        Meds/Allergies       Current Outpatient Medications:     lisinopril-hydrochlorothiazide (PRINZIDE,ZESTORETIC) 20-12.5 MG per tablet    Diclofenac Sodium (VOLTAREN) 1 %    meloxicam (Mobic) 15 mg tablet    polyethylene glycol (COLYTE) 4000 mL solution    Current Facility-Administered Medications:     lactated ringers infusion, 125 mL/hr, Intravenous, Continuous, 125 mL/hr at 02/20/25 1440    No Known Allergies    Objective     /68   Pulse 76   Temp 97.5 °F (36.4 °C) (Temporal)   Resp 18   Ht 5' 9\" (1.753 m)   Wt 99.8 kg (220 lb)   SpO2 98%   BMI 32.49 kg/m²       PHYSICAL EXAM    Gen: NAD  Head: NCAT  CV: RRR  CHEST: Clear  ABD: soft, NT/ND  EXT: no edema      ASSESSMENT/PLAN:  This is a 39 y.o. year old male here for colonoscopy, and he is stable and optimized for his procedure.        "

## 2025-02-21 RX ORDER — LISINOPRIL AND HYDROCHLOROTHIAZIDE 12.5; 2 MG/1; MG/1
1 TABLET ORAL DAILY
Qty: 90 TABLET | Refills: 0 | Status: SHIPPED | OUTPATIENT
Start: 2025-02-21

## 2025-02-21 NOTE — TELEPHONE ENCOUNTER
Patient needs an appointment. Please contact the patient to schedule an appointment. Last office visit: 03/06/24

## 2025-06-02 NOTE — PROGRESS NOTES
Cardiology  Follow Up   Office Visit Note  Roberto Mcguire   40 y.o.   male   MRN: [id number]  [facility] [place]  [address]  [address]  [place] [address] [phone]    PCP: Anton Fregoso DO  Cardiologist: Dr. Almanzar          Summary of Plan:  Heart healthy diet: Mediterranean or DASH.  Education provided  Echo  Continue to hydrate as he has been, avoiding excess caffeine, as he has been  start Toprol 12.5 mg/d to suppress PACs/ palpitations  Colon Ca screenin2025, up-to-date      Assessment/Plan  HTN  /70  On lisinopril/HCTZ 20/12.5 mg daily  Periodic home BP monitoring  DASH diet  Palpitations  Prior Holters have shown PACs  Will begin Toprol 12.5 mg daily  Obtain an echocardiogram  Screening lipids:  23:    Cardiac testing  48-hour Holter 24 Average heart rate 86 bpm.  Total VE 0.6%                        HPI  Roberto Mcguire is a 40 y.o.year old male with hypertension, palpitations and PVCs.  He follows notes with Dr. Almanzar and was last seen 3/6/2024.        3/6/24 OV Dr Almanzar  Per his note:  Discussion/Summary:  Hypertension: Controlled with losartan/HCTZ.     Palpitations: Previously noted to have PVCs on Holter monitor. This was a few years ago. They continue to occur, and they are somewhat bothersome to him. Repeat Holter monitor and echocardiogram. If he is has a high burden, can change his antihypertensive to include a beta-blocker. Otherwise, can continue with conservative management.     Still unclear if he will need any orthopedic surgical procedure for his current injury. If he does, there is no cardiac contraindication to proceeding and he is low risk.     Previous History:   Hypertension. Palpitations, has had PVCs on Holter monitor previously.     Returns for follow-up. Continues to do generally well. His blood pressure is controlled. He feels occasional palpitations, still. They are somewhat bothersome to  him. They can come every few days or sometimes every day. No obvious triggers, as before. He has previously had a Holter monitor done in 2021 which had PVCs present. Has had a stress echo in 2019, but no recent repeat.     He had an orthopedic injury with lateral epicondylitis. He is seeing orthopedic surgery. He has been doing physical therapy for several months but does not have major improvement just yet. He is going to be following up on March 19 and he is unsure if he will need a surgical procedure. He is hopeful not to        6/3/2025  Cardiology follow-up  He is a .  He is active without chest pain or shortness of breath.  He does have intermittent palpitations as noted previously..  He had a stress echo that was normal in October 2019.  He has worn prior event monitors which has shown occasional PACs, very brief SVT.  His palpitations have not been sustained.  They are somewhat bothersome to him.  He is compliant with his lisinopril HCTZ for his blood pressure.  His electrolytes have been satisfactory.  His thyroid has been checked in the past and has been within range.  His LDL in 2023 was 101..  Total cholesterol 151.  BP today 138/70.  I recommend an echocardiogram.  This is been ordered in the past yet not completed.  He thinks maybe insurance declined it in the past.  After shared decision making we will begin Toprol 12.5 mg daily.  He takes meloxicam infrequently for musculoskeletal symptoms          Review of Systems   Constitutional: Negative for chills.   Cardiovascular:  Positive for palpitations. Negative for chest pain, claudication, cyanosis, dyspnea on exertion, irregular heartbeat, leg swelling, near-syncope, orthopnea, paroxysmal nocturnal dyspnea and syncope.   Respiratory:  Negative for cough and shortness of breath.    Gastrointestinal:  Negative for heartburn and nausea.   Neurological:  Negative for dizziness, focal weakness, headaches, light-headedness and weakness.   All other  systems reviewed and are negative.      Assessment  Diagnoses and all orders for this visit:    Essential hypertension  -     POCT ECG  -     Echo complete w/ contrast if indicated; Future    Hip flexor tendinitis, left  -     meloxicam (Mobic) 15 mg tablet; Take 1 tablet (15 mg total) by mouth daily as needed for mild pain    Femoral acetabular impingement  -     meloxicam (Mobic) 15 mg tablet; Take 1 tablet (15 mg total) by mouth daily as needed for mild pain    PAC (premature atrial contraction)  -     metoprolol succinate (TOPROL-XL) 25 mg 24 hr tablet; Take 0.5 tablets (12.5 mg total) by mouth daily  -     Echo complete w/ contrast if indicated; Future        Past Medical History[1]    Past Surgical History[2]        Allergies  Allergies[3]      Medications  Current Medications[4]      Social History     Socioeconomic History    Marital status: /Civil Union     Spouse name: Not on file    Number of children: Not on file    Years of education: Not on file    Highest education level: Not on file   Occupational History    Not on file   Tobacco Use    Smoking status: Never    Smokeless tobacco: Never   Vaping Use    Vaping status: Never Used   Substance and Sexual Activity    Alcohol use: Yes     Comment: Social    Drug use: No    Sexual activity: Not on file   Other Topics Concern    Not on file   Social History Narrative    Not on file     Social Drivers of Health     Financial Resource Strain: Not on file   Food Insecurity: Not on file   Transportation Needs: Not on file   Physical Activity: Not on file   Stress: Not on file   Social Connections: Not on file   Intimate Partner Violence: Not on file   Housing Stability: Not on file       Family History[5]    Physical Exam  Vitals and nursing note reviewed.   Constitutional:       General: He is not in acute distress.  HENT:      Head: Normocephalic and atraumatic.     Eyes:      Extraocular Movements: Extraocular movements intact.      Conjunctiva/sclera:  "Conjunctivae normal.       Cardiovascular:      Rate and Rhythm: Normal rate and regular rhythm.      Pulses: Intact distal pulses.      Heart sounds: Normal heart sounds.   Pulmonary:      Effort: Pulmonary effort is normal.      Breath sounds: Normal breath sounds.   Abdominal:      General: Bowel sounds are normal.      Palpations: Abdomen is soft.     Musculoskeletal:         General: Normal range of motion.      Cervical back: Normal range of motion and neck supple.     Skin:     General: Skin is warm and dry.     Neurological:      Mental Status: He is alert and oriented to person, place, and time.     Psychiatric:         Mood and Affect: Mood normal.         Vitals: Blood pressure 138/70, pulse 98, weight 102 kg (225 lb 9.6 oz), SpO2 96%.   Wt Readings from Last 3 Encounters:   06/03/25 102 kg (225 lb 9.6 oz)   02/20/25 99.8 kg (220 lb)   12/18/24 104 kg (228 lb 12.8 oz)           Labs & Results:  Lab Results   Component Value Date    WBC 7.17 07/11/2024    HGB 14.6 07/11/2024    HCT 43.7 07/11/2024    MCV 94 07/11/2024     07/11/2024     No results found for: \"BNP\"  No components found for: \"CHEM\"  No results found for: \"CKTOTAL\", \"TROPONINI\", \"TROPONINT\", \"CKMBINDEX\"  No results found for this or any previous visit.    No results found for this or any previous visit.    No valid procedures specified.  No results found for this or any previous visit.                This note was completed in part utilizing [employer] direct voice recognition software.   Grammatical errors, random word insertion, spelling mistakes, and incomplete sentences may be an occasional consequence of the system secondary to software limitations, ambient noise and hardware issues. At the time of dictation, efforts were made to edit, clarify and /or correct errors.  Please read the chart carefully and recognize, using context, where substitutions have occurred.  If you have any questions or concerns about the context, text " or information contained within the body of this dictation, please contact myself, the provider, for further clarification           [1]   Past Medical History:  Diagnosis Date    GERD (gastroesophageal reflux disease)     Hypertension     Lateral epicondylitis    [2]   Past Surgical History:  Procedure Laterality Date    APPENDECTOMY      LATERAL EPICONDYLE RELEASE Right 6/27/2017    Procedure: ELBOW LATERAL EPICONDYLAR RELEASE;  Surgeon: Star Sharif MD;  Location: [facility];  Service: Orthopedics   [3] No Known Allergies  [4]   Current Outpatient Medications:     lisinopril-hydrochlorothiazide (PRINZIDE,ZESTORETIC) 20-12.5 MG per tablet, Take 1 tablet by mouth daily, Disp: 90 tablet, Rfl: 0    meloxicam (Mobic) 15 mg tablet, Take 1 tablet (15 mg total) by mouth daily as needed for mild pain, Disp: , Rfl:     metoprolol succinate (TOPROL-XL) 25 mg 24 hr tablet, Take 0.5 tablets (12.5 mg total) by mouth daily, Disp: 15 tablet, Rfl: 3    Diclofenac Sodium (VOLTAREN) 1 %, Apply 2 g topically 4 (four) times a day (Patient not taking: Reported on 11/25/2024), Disp: 100 g, Rfl: 1  [5]   Family History  Problem Relation Name Age of Onset    Colon cancer Mother      Hypertension Father

## 2025-06-03 ENCOUNTER — OFFICE VISIT (OUTPATIENT)
Dept: CARDIOLOGY CLINIC | Facility: CLINIC | Age: 40
End: 2025-06-03
Payer: COMMERCIAL

## 2025-06-03 VITALS
HEART RATE: 98 BPM | SYSTOLIC BLOOD PRESSURE: 138 MMHG | WEIGHT: 225.6 LBS | OXYGEN SATURATION: 96 % | BODY MASS INDEX: 33.32 KG/M2 | DIASTOLIC BLOOD PRESSURE: 70 MMHG

## 2025-06-03 DIAGNOSIS — I10 ESSENTIAL HYPERTENSION: Primary | ICD-10-CM

## 2025-06-03 DIAGNOSIS — M76.892 HIP FLEXOR TENDINITIS, LEFT: ICD-10-CM

## 2025-06-03 DIAGNOSIS — I49.1 PAC (PREMATURE ATRIAL CONTRACTION): ICD-10-CM

## 2025-06-03 DIAGNOSIS — M25.859 FEMORAL ACETABULAR IMPINGEMENT: ICD-10-CM

## 2025-06-03 PROCEDURE — 99214 OFFICE O/P EST MOD 30 MIN: CPT | Performed by: NURSE PRACTITIONER

## 2025-06-03 RX ORDER — MELOXICAM 15 MG/1
15 TABLET ORAL DAILY PRN
Start: 2025-06-03

## 2025-06-03 RX ORDER — METOPROLOL SUCCINATE 25 MG/1
12.5 TABLET, EXTENDED RELEASE ORAL DAILY
Qty: 15 TABLET | Refills: 3 | Status: SHIPPED | OUTPATIENT
Start: 2025-06-03

## 2025-06-03 NOTE — LETTER
[place] 3, 2025     Anton Fregoso DO  [address]  [address]  [facility] [address]    Patient: Roberto Mcguire   YOB: 1985   Date of Visit: 6/3/2025       Dear Dr. Anton Fregoso DO:    Thank you for referring Roberto Mcguire to me for evaluation. Below are my notes for this consultation.    If you have questions, please do not hesitate to call me. I look forward to following your patient along with you.         Sincerely,        OLIVIA Herrera        CC: No Recipients    OLIVIA Herrera  6/3/2025  2:00 PM  Sign when Signing Visit  Cardiology  Follow Up   Office Visit Note  Roberto Mcguire   40 y.o.   male   MRN: [id number]  [facility] [place]  [address]  [address]  [facility] [address]  [phone]    PCP: Anton Fregoso DO  Cardiologist: Dr. Almanzar          Summary of Plan:  Heart healthy diet: Mediterranean or DASH.  Education provided  Echo  Continue to hydrate as he has been, avoiding excess caffeine, as he has been  start Toprol 12.5 mg/d to suppress PACs/ palpitations  Colon Ca screenin2025, up-to-date      Assessment/Plan  HTN  /70  On lisinopril/HCTZ 20/12.5 mg daily  Periodic home BP monitoring  DASH diet  Palpitations  Prior Holters have shown PACs  Will begin Toprol 12.5 mg daily  Obtain an echocardiogram  Screening lipids:  23:    Cardiac testing  48-hour Holter 24 Average heart rate 86 bpm.  Total VE 0.6%                        HPI  Roberto Mcguire is a 40 y.o.year old male with hypertension, palpitations and PVCs.  He follows notes with Dr. Almanzar and was last seen 3/6/2024.        3/6/24 OV Dr Almanzar  Per his note:  Discussion/Summary:  Hypertension: Controlled with losartan/HCTZ.     Palpitations: Previously noted to have PVCs on Holter monitor. This was a few years ago. They continue to occur, and they are somewhat bothersome to him. Repeat Holter monitor and  echocardiogram. If he is has a high burden, can change his antihypertensive to include a beta-blocker. Otherwise, can continue with conservative management.     Still unclear if he will need any orthopedic surgical procedure for his current injury. If he does, there is no cardiac contraindication to proceeding and he is low risk.     Previous History:   Hypertension. Palpitations, has had PVCs on Holter monitor previously.     Returns for follow-up. Continues to do generally well. His blood pressure is controlled. He feels occasional palpitations, still. They are somewhat bothersome to him. They can come every few days or sometimes every day. No obvious triggers, as before. He has previously had a Holter monitor done in 2021 which had PVCs present. Has had a stress echo in 2019, but no recent repeat.     He had an orthopedic injury with lateral epicondylitis. He is seeing orthopedic surgery. He has been doing physical therapy for several months but does not have major improvement just yet. He is going to be following up on March 19 and he is unsure if he will need a surgical procedure. He is hopeful not to        6/3/2025  Cardiology follow-up  He is a .  He is active without chest pain or shortness of breath.  He does have intermittent palpitations as noted previously..  He had a stress echo that was normal in October 2019.  He has worn prior event monitors which has shown occasional PACs, very brief SVT.  His palpitations have not been sustained.  They are somewhat bothersome to him.  He is compliant with his lisinopril HCTZ for his blood pressure.  His electrolytes have been satisfactory.  His thyroid has been checked in the past and has been within range.  His LDL in 2023 was 101..  Total cholesterol 151.  BP today 138/70.  I recommend an echocardiogram.  This is been ordered in the past yet not completed.  He thinks maybe insurance declined it in the past.  After shared decision making we will begin  Toprol 12.5 mg daily.  He takes meloxicam infrequently for musculoskeletal symptoms          Review of Systems   Constitutional: Negative for chills.   Cardiovascular:  Positive for palpitations. Negative for chest pain, claudication, cyanosis, dyspnea on exertion, irregular heartbeat, leg swelling, near-syncope, orthopnea, paroxysmal nocturnal dyspnea and syncope.   Respiratory:  Negative for cough and shortness of breath.    Gastrointestinal:  Negative for heartburn and nausea.   Neurological:  Negative for dizziness, focal weakness, headaches, light-headedness and weakness.   All other systems reviewed and are negative.      Assessment  Diagnoses and all orders for this visit:    Essential hypertension  -     POCT ECG  -     Echo complete w/ contrast if indicated; Future    Hip flexor tendinitis, left  -     meloxicam (Mobic) 15 mg tablet; Take 1 tablet (15 mg total) by mouth daily as needed for mild pain    Femoral acetabular impingement  -     meloxicam (Mobic) 15 mg tablet; Take 1 tablet (15 mg total) by mouth daily as needed for mild pain    PAC (premature atrial contraction)  -     metoprolol succinate (TOPROL-XL) 25 mg 24 hr tablet; Take 0.5 tablets (12.5 mg total) by mouth daily  -     Echo complete w/ contrast if indicated; Future        Past Medical History[1]    Past Surgical History[2]        Allergies  Allergies[3]      Medications  Current Medications[4]      Social History     Socioeconomic History   • Marital status: /Civil Union     Spouse name: Not on file   • Number of children: Not on file   • Years of education: Not on file   • Highest education level: Not on file   Occupational History   • Not on file   Tobacco Use   • Smoking status: Never   • Smokeless tobacco: Never   Vaping Use   • Vaping status: Never Used   Substance and Sexual Activity   • Alcohol use: Yes     Comment: Social   • Drug use: No   • Sexual activity: Not on file   Other Topics Concern   • Not on file   Social  "History Narrative   • Not on file     Social Drivers of Health     Financial Resource Strain: Not on file   Food Insecurity: Not on file   Transportation Needs: Not on file   Physical Activity: Not on file   Stress: Not on file   Social Connections: Not on file   Intimate Partner Violence: Not on file   Housing Stability: Not on file       Family History[5]    Physical Exam  Vitals and nursing note reviewed.   Constitutional:       General: He is not in acute distress.  HENT:      Head: Normocephalic and atraumatic.     Eyes:      Extraocular Movements: Extraocular movements intact.      Conjunctiva/sclera: Conjunctivae normal.       Cardiovascular:      Rate and Rhythm: Normal rate and regular rhythm.      Pulses: Intact distal pulses.      Heart sounds: Normal heart sounds.   Pulmonary:      Effort: Pulmonary effort is normal.      Breath sounds: Normal breath sounds.   Abdominal:      General: Bowel sounds are normal.      Palpations: Abdomen is soft.     Musculoskeletal:         General: Normal range of motion.      Cervical back: Normal range of motion and neck supple.     Skin:     General: Skin is warm and dry.     Neurological:      Mental Status: He is alert and oriented to person, place, and time.     Psychiatric:         Mood and Affect: Mood normal.         Vitals: Blood pressure 138/70, pulse 98, weight 102 kg (225 lb 9.6 oz), SpO2 96%.   Wt Readings from Last 3 Encounters:   06/03/25 102 kg (225 lb 9.6 oz)   02/20/25 99.8 kg (220 lb)   12/18/24 104 kg (228 lb 12.8 oz)           Labs & Results:  Lab Results   Component Value Date    WBC 7.17 07/11/2024    HGB 14.6 07/11/2024    HCT 43.7 07/11/2024    MCV 94 07/11/2024     07/11/2024     No results found for: \"BNP\"  No components found for: \"CHEM\"  No results found for: \"CKTOTAL\", \"TROPONINI\", \"TROPONINT\", \"CKMBINDEX\"  No results found for this or any previous visit.    No results found for this or any previous visit.    No valid procedures " specified.  No results found for this or any previous visit.                This note was completed in part utilizing [employer] direct voice recognition software.   Grammatical errors, random word insertion, spelling mistakes, and incomplete sentences may be an occasional consequence of the system secondary to software limitations, ambient noise and hardware issues. At the time of dictation, efforts were made to edit, clarify and /or correct errors.  Please read the chart carefully and recognize, using context, where substitutions have occurred.  If you have any questions or concerns about the context, text or information contained within the body of this dictation, please contact myself, the provider, for further clarification           [1]   Past Medical History:  Diagnosis Date   • GERD (gastroesophageal reflux disease)    • Hypertension    • Lateral epicondylitis    [2]   Past Surgical History:  Procedure Laterality Date   • APPENDECTOMY     • LATERAL EPICONDYLE RELEASE Right 6/27/2017    Procedure: ELBOW LATERAL EPICONDYLAR RELEASE;  Surgeon: Star Sharif MD;  Location: [facility];  Service: Orthopedics   [3] No Known Allergies  [4]   Current Outpatient Medications:   •  lisinopril-hydrochlorothiazide (PRINZIDE,ZESTORETIC) 20-12.5 MG per tablet, Take 1 tablet by mouth daily, Disp: 90 tablet, Rfl: 0  •  meloxicam (Mobic) 15 mg tablet, Take 1 tablet (15 mg total) by mouth daily as needed for mild pain, Disp: , Rfl:   •  metoprolol succinate (TOPROL-XL) 25 mg 24 hr tablet, Take 0.5 tablets (12.5 mg total) by mouth daily, Disp: 15 tablet, Rfl: 3  •  Diclofenac Sodium (VOLTAREN) 1 %, Apply 2 g topically 4 (four) times a day (Patient not taking: Reported on 11/25/2024), Disp: 100 g, Rfl: 1  [5]   Family History  Problem Relation Name Age of Onset   • Colon cancer Mother     • Hypertension Father          [1]  Past Medical History:  Diagnosis Date   • GERD (gastroesophageal reflux disease)    •  Hypertension    • Lateral epicondylitis    [2]  Past Surgical History:  Procedure Laterality Date   • APPENDECTOMY     • LATERAL EPICONDYLE RELEASE Right 6/27/2017    Procedure: ELBOW LATERAL EPICONDYLAR RELEASE;  Surgeon: Star Sharif MD;  Location: [facility];  Service: Orthopedics   [3]  No Known Allergies  [4]    Current Outpatient Medications:   •  lisinopril-hydrochlorothiazide (PRINZIDE,ZESTORETIC) 20-12.5 MG per tablet, Take 1 tablet by mouth daily, Disp: 90 tablet, Rfl: 0  •  meloxicam (Mobic) 15 mg tablet, Take 1 tablet (15 mg total) by mouth daily as needed for mild pain, Disp: , Rfl:   •  metoprolol succinate (TOPROL-XL) 25 mg 24 hr tablet, Take 0.5 tablets (12.5 mg total) by mouth daily, Disp: 15 tablet, Rfl: 3  •  Diclofenac Sodium (VOLTAREN) 1 %, Apply 2 g topically 4 (four) times a day (Patient not taking: Reported on 11/25/2024), Disp: 100 g, Rfl: 1  [5]  Family History  Problem Relation Name Age of Onset   • Colon cancer Mother     • Hypertension Father

## 2025-06-12 DIAGNOSIS — I10 ESSENTIAL HYPERTENSION: ICD-10-CM

## 2025-06-12 RX ORDER — LISINOPRIL AND HYDROCHLOROTHIAZIDE 12.5; 2 MG/1; MG/1
1 TABLET ORAL DAILY
Qty: 90 TABLET | Refills: 1 | Status: SHIPPED | OUTPATIENT
Start: 2025-06-12

## 2025-07-22 ENCOUNTER — HOSPITAL ENCOUNTER (OUTPATIENT)
Dept: NON INVASIVE DIAGNOSTICS | Facility: CLINIC | Age: 40
Discharge: HOME | End: 2025-07-22
Attending: NURSE PRACTITIONER
Payer: COMMERCIAL

## 2025-07-22 VITALS
HEIGHT: 69 IN | SYSTOLIC BLOOD PRESSURE: 138 MMHG | DIASTOLIC BLOOD PRESSURE: 70 MMHG | BODY MASS INDEX: 33.31 KG/M2 | WEIGHT: 224.87 LBS | HEART RATE: 98 BPM

## 2025-07-22 DIAGNOSIS — I49.1 PAC (PREMATURE ATRIAL CONTRACTION): ICD-10-CM

## 2025-07-22 DIAGNOSIS — I10 ESSENTIAL HYPERTENSION: ICD-10-CM

## 2025-07-22 LAB
AORTIC ROOT: 3.5 CM
ASCENDING AORTA: 3.3 CM
BSA FOR ECHO PROCEDURE: 2.17 M2
E WAVE DECELERATION TIME: 184 MS
E/A RATIO: 1.2
FRACTIONAL SHORTENING: 38 (ref 28–44)
INTERVENTRICULAR SEPTUM IN DIASTOLE (PARASTERNAL SHORT AXIS VIEW): 1.1 CM
INTERVENTRICULAR SEPTUM: 1.1 CM (ref 0.6–1.1)
LAAS-AP2: 18.8 CM2
LAAS-AP4: 18.5 CM2
LEFT ATRIUM SIZE: 3.7 CM
LEFT ATRIUM VOLUME (MOD BIPLANE): 51 ML
LEFT ATRIUM VOLUME INDEX (MOD BIPLANE): 23.5 ML/M2
LEFT INTERNAL DIMENSION IN SYSTOLE: 3 CM (ref 2.1–4)
LEFT VENTRICULAR INTERNAL DIMENSION IN DIASTOLE: 4.8 CM (ref 3.5–6)
LEFT VENTRICULAR POSTERIOR WALL IN END DIASTOLE: 1.1 CM
LEFT VENTRICULAR STROKE VOLUME: 72 ML
LV EF US.2D.A4C+ESTIMATED: 61 %
LVSV (TEICH): 72 ML
MV E'TISSUE VEL-LAT: 18 CM/S
MV E'TISSUE VEL-SEP: 8 CM/S
MV PEAK A VEL: 0.6 M/S
MV PEAK E VEL: 72 CM/S
MV STENOSIS PRESSURE HALF TIME: 53 MS
MV VALVE AREA P 1/2 METHOD: 4.15
RA PRESSURE ESTIMATED: 3 MMHG
RIGHT ATRIUM AREA SYSTOLE A4C: 17.8 CM2
RIGHT VENTRICLE ID DIMENSION: 3.6 CM
SL CV LEFT ATRIUM LENGTH A2C: 5.5 CM
SL CV LV EF: 60
SL CV PED ECHO LEFT VENTRICLE DIASTOLIC VOLUME (MOD BIPLANE) 2D: 108 ML
SL CV PED ECHO LEFT VENTRICLE SYSTOLIC VOLUME (MOD BIPLANE) 2D: 36 ML
TRICUSPID ANNULAR PLANE SYSTOLIC EXCURSION: 2.2 CM

## 2025-07-22 PROCEDURE — 93306 TTE W/DOPPLER COMPLETE: CPT | Performed by: STUDENT IN AN ORGANIZED HEALTH CARE EDUCATION/TRAINING PROGRAM

## 2025-07-22 PROCEDURE — 93306 TTE W/DOPPLER COMPLETE: CPT

## (undated) DEVICE — CHLORAPREP HI-LITE 26ML ORANGE

## (undated) DEVICE — VIOLET BRAIDED (POLYGLACTIN 910), SYNTHETIC ABSORBABLE SUTURE: Brand: COATED VICRYL

## (undated) DEVICE — DISPOSABLE EQUIPMENT COVER: Brand: SMALL TOWEL DRAPE

## (undated) DEVICE — GLOVE INDICATOR PI UNDERGLOVE SZ 8 BLUE

## (undated) DEVICE — GLOVE SRG BIOGEL 7.5

## (undated) DEVICE — ACE WRAP 4 IN UNSTERILE

## (undated) DEVICE — PACK PLASTIC HAND PBDS

## (undated) DEVICE — CUFF TOURNIQUET 18 X 4 IN QUICK CONNECT DISP 1 BLADDER

## (undated) DEVICE — OCCLUSIVE GAUZE STRIP,3% BISMUTH TRIBROMOPHENATE IN PETROLATUM BLEND: Brand: XEROFORM

## (undated) DEVICE — INTENDED FOR TISSUE SEPARATION, AND OTHER PROCEDURES THAT REQUIRE A SHARP SURGICAL BLADE TO PUNCTURE OR CUT.: Brand: BARD-PARKER SAFETY BLADES SIZE 15, STERILE